# Patient Record
Sex: FEMALE | URBAN - METROPOLITAN AREA
[De-identification: names, ages, dates, MRNs, and addresses within clinical notes are randomized per-mention and may not be internally consistent; named-entity substitution may affect disease eponyms.]

---

## 2021-05-20 ENCOUNTER — TELEPHONE (OUTPATIENT)
Dept: OBGYN CLINIC | Facility: CLINIC | Age: 31
End: 2021-05-20

## 2021-05-20 NOTE — TELEPHONE ENCOUNTER
Angella left a message requesting lab results done last month  Spoke with Regina Desai whom was able to locate pts  Under her maiden name of Bev Rogers Lie is to have results faxed to office  Results received & given to KRISHAN Evangelista to review

## 2021-05-20 NOTE — TELEPHONE ENCOUNTER
Please call pt  Labs look normal   Normal Thyroid  Not anemic Hgb is 13 9  FSH/LH indicated ovulation  Awaiting Ultrasound DID she get that done?

## 2023-08-21 ENCOUNTER — NURSE TRIAGE (OUTPATIENT)
Age: 33
End: 2023-08-21

## 2023-08-21 NOTE — TELEPHONE ENCOUNTER
Regarding: Verena Raya needed for severe abdominal  ----- Message from Enrique Mins sent at 8/21/2023 11:55 AM EDT -----  Patient is in a lot of abdominal pain with chest pain and needs an urgent OV. She is going into an urgent care now.

## 2023-08-22 ENCOUNTER — OFFICE VISIT (OUTPATIENT)
Dept: GASTROENTEROLOGY | Facility: CLINIC | Age: 33
End: 2023-08-22
Payer: COMMERCIAL

## 2023-08-22 ENCOUNTER — TELEPHONE (OUTPATIENT)
Dept: GASTROENTEROLOGY | Facility: CLINIC | Age: 33
End: 2023-08-22

## 2023-08-22 VITALS
WEIGHT: 142 LBS | HEIGHT: 66 IN | BODY MASS INDEX: 22.82 KG/M2 | SYSTOLIC BLOOD PRESSURE: 104 MMHG | DIASTOLIC BLOOD PRESSURE: 60 MMHG

## 2023-08-22 DIAGNOSIS — R10.84 GENERALIZED ABDOMINAL PAIN: ICD-10-CM

## 2023-08-22 DIAGNOSIS — R10.13 EPIGASTRIC PAIN: Primary | ICD-10-CM

## 2023-08-22 PROBLEM — K21.9 GASTROESOPHAGEAL REFLUX DISEASE WITHOUT ESOPHAGITIS: Status: ACTIVE | Noted: 2023-08-22

## 2023-08-22 PROCEDURE — 99244 OFF/OP CNSLTJ NEW/EST MOD 40: CPT | Performed by: NURSE PRACTITIONER

## 2023-08-22 RX ORDER — MELOXICAM 7.5 MG/1
TABLET ORAL
COMMUNITY
Start: 2023-08-21

## 2023-08-22 RX ORDER — PANTOPRAZOLE SODIUM 40 MG/1
40 TABLET, DELAYED RELEASE ORAL DAILY
COMMUNITY
Start: 2023-08-06 | End: 2023-08-22

## 2023-08-22 RX ORDER — OMEPRAZOLE 40 MG/1
40 CAPSULE, DELAYED RELEASE ORAL DAILY
Qty: 30 CAPSULE | Refills: 5 | Status: SHIPPED | OUTPATIENT
Start: 2023-08-22

## 2023-08-22 RX ORDER — SUCRALFATE ORAL 1 G/10ML
SUSPENSION ORAL
COMMUNITY
Start: 2023-08-21

## 2023-08-22 RX ORDER — AZITHROMYCIN 500 MG/1
TABLET, FILM COATED ORAL
COMMUNITY
Start: 2023-08-06

## 2023-08-22 NOTE — LETTER
2023     Julian Escamilla DO  [address] [address]    Patient: Sergio Davis   YOB: 1990   Date of Visit: 2023       Dear Dr. Mckenzie : Thank you for referring Sergio Davis to me for evaluation. Below are my notes for this consultation. If you have questions, please do not hesitate to call me. I look forward to following your patient along with you. Sincerely,        KRISHAN Newton        CC: No Recipients    Jeff Coon  2023  4:56 PM  Sign when Signing Visit    [facility] - Outpatient Consultation  Sergio Davis 35 y.o. female MRN: [id number]  Encounter: [id number]    ASSESSMENT AND PLAN:      1. Epigastric pain  2. Chest pain  3. Generalized abdominal pain  4 to 6-week history of epigastric and mid sternal chest pain. Initially experienced reflux symptoms which were relieved with  PPI but no improvement in pain on medication  Also experiencing vague left lower quadrant abdominal pain  Evaluation at [facility] ED was negative per patient  Symptoms may be related to gastritis, esophagitis, possible PUD, H. Pylori  -Scheduled for EGD at [facility]  -Change PPI to omeprazole 40 mg daily taken 30 minutes prior to a meal  -Avoid use of NSAIDs and meloxicam which was prescribed for her in ED  -Review GERD diet and lifestyle modifications  -We will obtain records from [facility] to review recent labs and CT scan of the abdomen    4. Diffuse body aches  Patient reports diffuse muscle aches and tingling of extremities  Feels may be related to PPI  Recommend follow-up with PCP, consider possible Lyme's disease      Followup Appointment: 2 months  ______________________________________________________________________    Chief Complaint   Patient presents with   • Abdominal Pain     Pt states she feels like she has tingling pain throughout her body.  She has overall pain throughout her body. Abdominal pain and nausea, felt like something in her throat. Pt was also experiencing diarrhea. HPI:   Valencia Brewer is a 35y.o. year old female who presents 4 to 6-week history of epigastric pain and chest pain. Initially she began experiencing what she describes as heartburn. She was evaluated by her PCP and started on pantoprazole 40 mg daily which did improve reflux symptoms however she continued to experience epigastric pain and mid chest pressure. Over the past several weeks, she is experiencing generalized muscle aches and shooting pains. Also experiencing extremity tingling  Today is reporting 1 week history of lower abdominal discomfort which occurs randomly, lasts 1 to 2 minutes and resolves    Denies dysphagia, no liquid reflux. Her appetite has been decreased but denies nausea or vomiting. No unintentional weight loss  No acute change in her bowel habits. No melena or rectal bleeding    Denies regular alcohol use, no tobacco use. Takes ibuprofen 600 mg 2-3 times per day x3 days monthly for menstrual cramps. Last taken approximately 2 weeks ago    She was seen in [facility] ED in early August.  Those records are not available but she reports normal blood work and normal CT scan of the abdomen  Was also evaluated at urgent care yesterday 8/21 and prescribed sucralfate    Greater than 40 minutes spent with patient face-to-face reviewing HPI and previous evaluation    Historical Information   History reviewed. No pertinent past medical history. Past Surgical History:   Procedure Laterality Date   • TONSILLECTOMY       Social History     Substance and Sexual Activity   Alcohol Use Yes    Comment: socially     Social History     Substance and Sexual Activity   Drug Use Never     Social History     Tobacco Use   Smoking Status Never   Smokeless Tobacco Never     History reviewed. No pertinent family history.     Meds/Allergies     Current Outpatient Medications:   •  azithromycin (ZITHROMAX) 500 MG tablet  •  meloxicam (MOBIC) 7.5 mg tablet  •  pantoprazole (PROTONIX) 40 mg tablet  •  sucralfate (CARAFATE) 1 g/10 mL suspension    Allergies   Allergen Reactions   • Penicillins Hives       PHYSICAL EXAM:    Blood pressure 104/60, height 5' 5.5" (1.664 m), weight 64.4 kg (142 lb). Body mass index is 23.27 kg/m². General Appearance: NAD, cooperative, alert  Eyes: Anicteric  ENT:  Normocephalic, atraumatic, normal mucosa. Neck:  Supple, symmetrical, trachea midline,   Resp:  Clear to auscultation bilaterally; no rales, rhonchi or wheezing; respirations unlabored   CV:  S1 S2, Regular rate and rhythm; no murmur, rub, or gallop. GI:  Soft, non-tender, non-distended; normal bowel sounds; no masses, no organomegaly   Rectal: Deferred  Musculoskeletal: No cyanosis, clubbing or edema. Normal ROM. Skin:  No jaundice, rashes, or lesions   Psych: Normal affect, good eye contact  Neuro: No gross deficits, AAOx3          REVIEW OF SYSTEMS:    CONSTITUTIONAL: Denies any fever, chills, rigors, and weight loss. HEENT: No earache or tinnitus. Denies hearing loss or visual disturbances. CARDIOVASCULAR: No chest pain or palpitations. RESPIRATORY: Denies any cough, hemoptysis, shortness of breath or dyspnea on exertion. GASTROINTESTINAL: As noted in the History of Present Illness. GENITOURINARY: No problems with urination. Denies any hematuria or dysuria. NEUROLOGIC: No dizziness or vertigo, denies headaches. MUSCULOSKELETAL: Denies any muscle or joint pain. SKIN: Denies skin rashes or itching. ENDOCRINE: Denies excessive thirst. Denies intolerance to heat or cold. PSYCHOSOCIAL: Denies depression or anxiety. Denies any recent memory loss.

## 2023-08-22 NOTE — TELEPHONE ENCOUNTER
Scheduled date of EGD(as of today): 9/25/23  Physician performing EGD: Dr Mookie Mata  Location of EGD: [facility]  Instructions reviewed with patient by: Cha Bradley  Clearances: No

## 2023-08-22 NOTE — PROGRESS NOTES
[facility] - Outpatient Consultation  Court Watkins 35 y.o. female MRN: [id number]  Encounter: [id number]    ASSESSMENT AND PLAN:      1. Epigastric pain  2. Chest pain  3. Generalized abdominal pain  4 to 6-week history of epigastric and mid sternal chest pain. Initially experienced reflux symptoms which were relieved with  PPI but no improvement in pain on medication  Also experiencing vague left lower quadrant abdominal pain  Evaluation at [facility] ED was negative per patient  Symptoms may be related to gastritis, esophagitis, possible PUD, H. Pylori  -Scheduled for EGD at [facility]  -Change PPI to omeprazole 40 mg daily taken 30 minutes prior to a meal  -Avoid use of NSAIDs and meloxicam which was prescribed for her in ED  -Review GERD diet and lifestyle modifications  -We will obtain records from [facility] to review recent labs and CT scan of the abdomen    4. Diffuse body aches  Patient reports diffuse muscle aches and tingling of extremities  Feels may be related to PPI  Recommend follow-up with PCP, consider possible Lyme's disease      Followup Appointment: 2 months  ______________________________________________________________________    Chief Complaint   Patient presents with   • Abdominal Pain     Pt states she feels like she has tingling pain throughout her body. She has overall pain throughout her body. Abdominal pain and nausea, felt like something in her throat. Pt was also experiencing diarrhea. HPI:   Court Watkins is a 35y.o. year old female who presents 4 to 6-week history of epigastric pain and chest pain. Initially she began experiencing what she describes as heartburn. She was evaluated by her PCP and started on pantoprazole 40 mg daily which did improve reflux symptoms however she continued to experience epigastric pain and mid chest pressure.       Over the past several weeks, she is experiencing generalized muscle aches and shooting pains. Also experiencing extremity tingling  Today is reporting 1 week history of lower abdominal discomfort which occurs randomly, lasts 1 to 2 minutes and resolves    Denies dysphagia, no liquid reflux. Her appetite has been decreased but denies nausea or vomiting. No unintentional weight loss  No acute change in her bowel habits. No melena or rectal bleeding    Denies regular alcohol use, no tobacco use. Takes ibuprofen 600 mg 2-3 times per day x3 days monthly for menstrual cramps. Last taken approximately 2 weeks ago    She was seen in [facility] ED in early August.  Those records are not available but she reports normal blood work and normal CT scan of the abdomen  Was also evaluated at urgent care yesterday 8/21 and prescribed sucralfate    Greater than 40 minutes spent with patient face-to-face reviewing HPI and previous evaluation    Historical Information   History reviewed. No pertinent past medical history. Past Surgical History:   Procedure Laterality Date   • TONSILLECTOMY       Social History     Substance and Sexual Activity   Alcohol Use Yes    Comment: socially     Social History     Substance and Sexual Activity   Drug Use Never     Social History     Tobacco Use   Smoking Status Never   Smokeless Tobacco Never     History reviewed. No pertinent family history. Meds/Allergies     Current Outpatient Medications:   •  azithromycin (ZITHROMAX) 500 MG tablet  •  meloxicam (MOBIC) 7.5 mg tablet  •  pantoprazole (PROTONIX) 40 mg tablet  •  sucralfate (CARAFATE) 1 g/10 mL suspension    Allergies   Allergen Reactions   • Penicillins Hives       PHYSICAL EXAM:    Blood pressure 104/60, height 5' 5.5" (1.664 m), weight 64.4 kg (142 lb). Body mass index is 23.27 kg/m². General Appearance: NAD, cooperative, alert  Eyes: Anicteric  ENT:  Normocephalic, atraumatic, normal mucosa.     Neck:  Supple, symmetrical, trachea midline,   Resp:  Clear to auscultation bilaterally; no rales, rhonchi or wheezing; respirations unlabored   CV:  S1 S2, Regular rate and rhythm; no murmur, rub, or gallop. GI:  Soft, non-tender, non-distended; normal bowel sounds; no masses, no organomegaly   Rectal: Deferred  Musculoskeletal: No cyanosis, clubbing or edema. Normal ROM. Skin:  No jaundice, rashes, or lesions   Psych: Normal affect, good eye contact  Neuro: No gross deficits, AAOx3          REVIEW OF SYSTEMS:    CONSTITUTIONAL: Denies any fever, chills, rigors, and weight loss. HEENT: No earache or tinnitus. Denies hearing loss or visual disturbances. CARDIOVASCULAR: No chest pain or palpitations. RESPIRATORY: Denies any cough, hemoptysis, shortness of breath or dyspnea on exertion. GASTROINTESTINAL: As noted in the History of Present Illness. GENITOURINARY: No problems with urination. Denies any hematuria or dysuria. NEUROLOGIC: No dizziness or vertigo, denies headaches. MUSCULOSKELETAL: Denies any muscle or joint pain. SKIN: Denies skin rashes or itching. ENDOCRINE: Denies excessive thirst. Denies intolerance to heat or cold. PSYCHOSOCIAL: Denies depression or anxiety. Denies any recent memory loss.

## 2023-09-18 ENCOUNTER — TELEPHONE (OUTPATIENT)
Dept: GASTROENTEROLOGY | Facility: CLINIC | Age: 33
End: 2023-09-18

## 2023-09-22 ENCOUNTER — TELEPHONE (OUTPATIENT)
Dept: GASTROENTEROLOGY | Facility: AMBULATORY SURGERY CENTER | Age: 33
End: 2023-09-22

## 2023-09-22 NOTE — TELEPHONE ENCOUNTER
Pt returning call to Estephania Lance, stated she would available for earlier time as offered in . Please call pt back at [phone].

## 2023-09-25 ENCOUNTER — ANESTHESIA EVENT (OUTPATIENT)
Dept: GASTROENTEROLOGY | Facility: AMBULATORY SURGERY CENTER | Age: 33
End: 2023-09-25

## 2023-09-25 ENCOUNTER — HOSPITAL ENCOUNTER (OUTPATIENT)
Dept: GASTROENTEROLOGY | Facility: AMBULATORY SURGERY CENTER | Age: 33
Discharge: HOME | End: 2023-09-25
Payer: COMMERCIAL

## 2023-09-25 ENCOUNTER — ANESTHESIA (OUTPATIENT)
Dept: GASTROENTEROLOGY | Facility: AMBULATORY SURGERY CENTER | Age: 33
End: 2023-09-25

## 2023-09-25 VITALS
OXYGEN SATURATION: 100 % | WEIGHT: 140 LBS | HEART RATE: 70 BPM | TEMPERATURE: 98.7 F | SYSTOLIC BLOOD PRESSURE: 106 MMHG | RESPIRATION RATE: 15 BRPM | HEIGHT: 65 IN | BODY MASS INDEX: 23.32 KG/M2 | DIASTOLIC BLOOD PRESSURE: 55 MMHG

## 2023-09-25 DIAGNOSIS — R10.13 EPIGASTRIC PAIN: ICD-10-CM

## 2023-09-25 LAB
EXT PREGNANCY TEST URINE: NEGATIVE
EXT. CONTROL: NORMAL

## 2023-09-25 PROCEDURE — 88305 TISSUE EXAM BY PATHOLOGIST: CPT | Performed by: PATHOLOGY

## 2023-09-25 PROCEDURE — 43239 EGD BIOPSY SINGLE/MULTIPLE: CPT | Performed by: STUDENT IN AN ORGANIZED HEALTH CARE EDUCATION/TRAINING PROGRAM

## 2023-09-25 RX ORDER — PROPOFOL 10 MG/ML
INJECTION, EMULSION INTRAVENOUS AS NEEDED
Status: DISCONTINUED | OUTPATIENT
Start: 2023-09-25 | End: 2023-09-25

## 2023-09-25 RX ORDER — SODIUM CHLORIDE, SODIUM LACTATE, POTASSIUM CHLORIDE, CALCIUM CHLORIDE 600; 310; 30; 20 MG/100ML; MG/100ML; MG/100ML; MG/100ML
50 INJECTION, SOLUTION INTRAVENOUS CONTINUOUS
Status: DISCONTINUED | OUTPATIENT
Start: 2023-09-25 | End: 2023-09-25

## 2023-09-25 RX ORDER — LORATADINE 10 MG/1
TABLET ORAL
COMMUNITY

## 2023-09-25 RX ADMIN — PROPOFOL 100 MG: 10 INJECTION, EMULSION INTRAVENOUS at 13:07

## 2023-09-25 RX ADMIN — SODIUM CHLORIDE, SODIUM LACTATE, POTASSIUM CHLORIDE, CALCIUM CHLORIDE: 600; 310; 30; 20 INJECTION, SOLUTION INTRAVENOUS at 13:12

## 2023-09-25 RX ADMIN — SODIUM CHLORIDE, SODIUM LACTATE, POTASSIUM CHLORIDE, CALCIUM CHLORIDE 50 ML/HR: 600; 310; 30; 20 INJECTION, SOLUTION INTRAVENOUS at 13:02

## 2023-09-25 RX ADMIN — PROPOFOL 50 MG: 10 INJECTION, EMULSION INTRAVENOUS at 13:09

## 2023-09-25 NOTE — H&P
History and Physical - [facility]  Sunshine Hoover 35 y.o. female MRN: [id number]    HPI: Sunshine Hoover is a 35 y.o. female who presents for EGD for evaluation of generalized abdominal pain. REVIEW OF SYSTEMS: Per the HPI, and otherwise unremarkable. Historical Information   Past Medical History:   Diagnosis Date   • Anxiety    • Asthma    • Cancer ([address])     skin   • Depression    • Pneumonia      Past Surgical History:   Procedure Laterality Date   • SKIN BIOPSY     • SKIN CANCER EXCISION Right     upper thigh   • TONSILLECTOMY       Social History   Social History     Substance and Sexual Activity   Alcohol Use Yes    Comment: socially     Social History     Substance and Sexual Activity   Drug Use Never     Social History     Tobacco Use   Smoking Status Never   Smokeless Tobacco Never     History reviewed. No pertinent family history. Meds/Allergies       Current Outpatient Medications:   •  loratadine (CLARITIN) 10 mg tablet  •  azithromycin (ZITHROMAX) 500 MG tablet  •  meloxicam (MOBIC) 7.5 mg tablet  •  omeprazole (PriLOSEC) 40 MG capsule  •  sucralfate (CARAFATE) 1 g/10 mL suspension    Current Facility-Administered Medications:   •  lactated ringers infusion, 50 mL/hr, Intravenous, Continuous    Allergies   Allergen Reactions   • Penicillins Hives       Objective     Temp 98.7 °F (37.1 °C) (Temporal)   Ht 5' 5" (1.651 m)   Wt 63.5 kg (140 lb)   LMP 08/29/2023   BMI 23.30 kg/m²     PHYSICAL EXAM    Gen: NAD AAOx3  Head: Normocephalic, Atraumatic  CV: S1S2 RRR no m/r/g  CHEST: Clear b/l no c/r/w  ABD: soft, +BS NT/ND  EXT: no edema    ASSESSMENT/PLAN:  This is a 35y.o. year old female here for EGD, and she is stable and optimized for her procedure.

## 2023-09-25 NOTE — ANESTHESIA POSTPROCEDURE EVALUATION
Post-Op Assessment Note    CV Status:  Stable  Pain Score: 0    Pain management: adequate     Mental Status:  Alert and awake   Hydration Status:  Euvolemic   PONV Controlled:  Controlled   Airway Patency:  Patent      Post Op Vitals Reviewed: Yes      Staff: CRNA         No notable events documented.     BP   101/67   Temp   98   Pulse  80   Resp  16   SpO2   95

## 2023-09-25 NOTE — ANESTHESIA PREPROCEDURE EVALUATION
Procedure:  EGD    Relevant Problems   ANESTHESIA (within normal limits)      GI/HEPATIC   (+) Gastroesophageal reflux disease without esophagitis      NEURO/PSYCH   (+) Anxiety      PULMONARY   (+) Asthma (Resolved) (childhood)   (-) Sleep apnea   (-) Smoking   (-) URI (upper respiratory infection)      Physical Exam    Airway    Mallampati score: II  TM Distance: >3 FB  Neck ROM: full     Dental   No notable dental hx     Cardiovascular      Pulmonary      Other Findings        Anesthesia Plan  ASA Score- 1     Anesthesia Type- IV sedation with anesthesia with ASA Monitors. Additional Monitors:   Airway Plan:           Plan Factors-Exercise tolerance (METS): >4 METS. Chart reviewed. Existing labs reviewed. Patient summary reviewed. Patient is not a current smoker. Induction- intravenous. Postoperative Plan-     Informed Consent- Anesthetic plan and risks discussed with patient. I personally reviewed this patient with the CRNA. Discussed and agreed on the Anesthesia Plan with the CRNA. Bertram Herrera

## 2023-09-26 ENCOUNTER — NURSE TRIAGE (OUTPATIENT)
Age: 33
End: 2023-09-26

## 2023-09-26 NOTE — TELEPHONE ENCOUNTER
----- Message from Susana Hany sent at 9/26/2023  9:53 AM EDT -----  Pt called in requesting to speak to a nurse. Pt stated that she had her EGD completed yesterday. She went home and slept. When she woke up in the evening she was feeling tingling and pain all over her body. She is also having chest pain.

## 2023-09-27 NOTE — TELEPHONE ENCOUNTER
Pt call transferred to nurses line     Pt explained that her gas pain was relieved by gas-x and ginger ale yesterday. However, today symptoms are different w/ left shoulder and left arm and left neck pain with basic movement. She stated they feel stiff. I advised heating pad, tylenol, hot shower. However, pt stated she tried these methods and is concerned by her symptoms. She rates her pain 9/10. I advised ED.

## 2023-09-28 PROCEDURE — 88305 TISSUE EXAM BY PATHOLOGIST: CPT | Performed by: PATHOLOGY

## 2023-10-05 ENCOUNTER — OFFICE VISIT (OUTPATIENT)
Dept: GASTROENTEROLOGY | Facility: CLINIC | Age: 33
End: 2023-10-05
Payer: COMMERCIAL

## 2023-10-05 VITALS
BODY MASS INDEX: 22.99 KG/M2 | DIASTOLIC BLOOD PRESSURE: 74 MMHG | WEIGHT: 138 LBS | HEIGHT: 65 IN | SYSTOLIC BLOOD PRESSURE: 112 MMHG

## 2023-10-05 DIAGNOSIS — R10.84 GENERALIZED ABDOMINAL PAIN: Primary | ICD-10-CM

## 2023-10-05 DIAGNOSIS — R52 BODY ACHES: ICD-10-CM

## 2023-10-05 DIAGNOSIS — M54.2 NECK PAIN: ICD-10-CM

## 2023-10-05 PROCEDURE — 99214 OFFICE O/P EST MOD 30 MIN: CPT | Performed by: STUDENT IN AN ORGANIZED HEALTH CARE EDUCATION/TRAINING PROGRAM

## 2023-10-05 RX ORDER — MELATONIN
1000 DAILY
COMMUNITY

## 2023-10-05 RX ORDER — CHLORAL HYDRATE 500 MG
1000 CAPSULE ORAL DAILY
COMMUNITY

## 2023-10-05 RX ORDER — FAMOTIDINE 20 MG/1
20 TABLET, FILM COATED ORAL 2 TIMES DAILY
Qty: 28 TABLET | Refills: 0 | Status: SHIPPED | OUTPATIENT
Start: 2023-10-05 | End: 2023-10-19

## 2023-10-05 RX ORDER — IBUPROFEN 600 MG/1
TABLET ORAL
COMMUNITY
Start: 2023-09-27

## 2023-10-05 RX ORDER — SACCHAROMYCES BOULARDII 250 MG
250 CAPSULE ORAL 2 TIMES DAILY
COMMUNITY

## 2023-10-05 NOTE — PROGRESS NOTES
[facility] - Outpatient Follow-up Note  Sin Ray 35 y.o. female MRN: [id number]  Encounter: [id number]    ASSESSMENT AND PLAN:      1. Generalized abdominal pain  Possible functional etiology. Uncertain if related to her body aches in the past.  Lyme testing is negative. She is doing well on liquid supplement and will continue this. We discussed trying a low-dose tricyclic antidepressant but will hold off for now. She has about throughout allergy testing and I informed her this is not advised per our guidelines but she can follow-up with allergist if necessary. We also discussed the low FODMAP diet. - famotidine (PEPCID) 20 mg tablet; Take 1 tablet (20 mg total) by mouth 2 (two) times a day for 14 days  Dispense: 28 tablet; Refill: 0    2. Neck pain  Possible neck strain/spasm, may be related to positioning during EGD although it was an uncomplicated procedure and not prolonged. She has had other body aches in the past so uncertain if there is an underlying musculoskeletal condition. We will provide referral to rheumatology to see if she needs any further work-up. - Ambulatory Referral to Rheumatology; Future    3. Body aches  She is taking ibuprofen for body aches. We discussed the risk for peptic ulcer disease on high-dose ibuprofen. Given that she is not tolerating PPI in the past we will start famotidine to try to provide some GI protection in the meantime. She does not like being on medication so we will prescribe a 2-week course.       Follow up appointment: 3 months  ______________________________________________________________________    Chief Complaint   Patient presents with   • Follow-up     Procedure follow up,  patient seen in [address] [place] [address] last week post op for pain, patient is still having epigastric pain     HPI:   Patient is a 35 y.o. female with a significant PMH of reflux, body aches presenting for follow up regarding neck/arm pain after recent endoscopy. She was initially seen in the office for reflux symptoms and was noted to have body aches at that time. She had been started on omeprazole but felt like it made her stomach symptoms worse so stopped it. Follow-up endoscopy was unremarkable with no significant findings on biopsies. Weeks after her endoscopy she experienced neck pain going down her left arm so went to [facility] emergency department where they did blood work-up including Lyme testing which was negative. Chest x-ray was unremarkable. She was started on ibuprofen 600 mg at that time. Her left arm pain improved but then she started having neck pain with right arm pain. Pain described as burning/tingling. She has had similar pains in other joints and extremities in the past.  She has recently started a liquid supplement with improvement of her abdominal complaints. Historical Information   Past Medical History:   Diagnosis Date   • Anxiety    • Asthma    • Cancer ([address])     skin   • Depression    • Pneumonia      Past Surgical History:   Procedure Laterality Date   • SKIN BIOPSY     • SKIN CANCER EXCISION Right     upper thigh   • TONSILLECTOMY       Social History     Substance and Sexual Activity   Alcohol Use Yes    Comment: socially     Social History     Substance and Sexual Activity   Drug Use Never     Social History     Tobacco Use   Smoking Status Never   Smokeless Tobacco Never     History reviewed. No pertinent family history.       Current Outpatient Medications:   •  Calcium Carbonate (CALCIUM 500 PO)  •  cholecalciferol (VITAMIN D3) 1,000 units tablet  •  cyanocobalamin (VITAMIN B-12) 100 MCG tablet  •  famotidine (PEPCID) 20 mg tablet  •  ibuprofen (MOTRIN) 362 mg tablet  •  Licorice, Glycyrrhiza glabra, (LICORICE ROOT PO)  •  loratadine (CLARITIN) 10 mg tablet  •  Omega-3 Fatty Acids (fish oil) 1,000 mg  •  Prenatal MV-Min-Fe Fum-FA-DHA (PRENATAL 1 PO)  •  saccharomyces boulardii (FLORASTOR) 250 mg capsule  • azithromycin (ZITHROMAX) 500 MG tablet  •  meloxicam (MOBIC) 7.5 mg tablet  •  omeprazole (PriLOSEC) 40 MG capsule  •  sucralfate (CARAFATE) 1 g/10 mL suspension  Allergies   Allergen Reactions   • Penicillins Hives     Reviewed medications and allergies and updated as indicated    PHYSICAL EXAM:    Blood pressure 112/74, height 5' 5" (1.651 m), weight 62.6 kg (138 lb), last menstrual period 08/29/2023. Body mass index is 22.96 kg/m². General Appearance: NAD, cooperative, alert  Eyes: Anicteric  GI:  Soft, non-tender, non-distended; normal bowel sounds; no masses, no organomegaly   Rectal: Deferred  Musculoskeletal: No edema. Skin:  No jaundice    Lab Results:   No results found for: "WBC", "HGB", "MCV", "PLT"  No results found for: "NA", "K", "CL", "CO2", "ANIONGAP", "BUN", "CREATININE", "GLUCOSE", "GLUF", "CALCIUM", "Marthenia Daniella", "AST", "ALT", "ALKPHOS", "PROT", "BILITOT", "EGFR"  No results found for: "IRON", "TIBC", "FERRITIN"  No results found for: "LIPASE"    Radiology Results:   EGD    Result Date: 9/25/2023  Narrative: Table formatting from the original result was not included. [address] [address] [facility] [phone] DATE OF SERVICE: 9/25/23 PHYSICIAN(S): Attending: Enrike Berg DO Fellow: No Staff Documented INDICATION: Epigastric pain POST-OP DIAGNOSIS: See the impression below. PREPROCEDURE: Informed consent was obtained for the procedure, including sedation. Risks of perforation, hemorrhage, adverse drug reaction and aspiration were discussed. The patient was placed in the left lateral decubitus position. Patient was explained about the risks and benefits of the procedure. Risks including but not limited to bleeding, infection, and perforation were explained in detail. Also explained about less than 100% sensitivity with the exam and other alternatives.  PROCEDURE: EGD DETAILS OF PROCEDURE: Patient was taken to the procedure room where a time out was performed to confirm correct patient and correct procedure. The patient underwent monitored anesthesia care, which was administered by an anesthesia professional. The patient's blood pressure, ECG, heart rate, level of consciousness, respirations and oxygen were monitored throughout the procedure. The scope was introduced through the mouth and advanced to the second part of the duodenum. Retroflexion was performed in the fundus. The patient's estimated blood loss was minimal (<5 mL). The procedure was not difficult. The patient tolerated the procedure well. There were no apparent adverse events.  ANESTHESIA INFORMATION: ASA: I Anesthesia Type: IV Sedation with Anesthesia MEDICATIONS: lactated ringers infusion 150 mL*  *From user-documented volume (Totals for administrations occurring from 1305 to 1314 on 09/25/23) FINDINGS: The esophagus, stomach and duodenum appeared normal. Performed forceps biopsies in the stomach to rule out H. pylori Performed multiple forceps biopsies in the middle third of the esophagus to rule out eosinophilic esophagitis Regular Z-line 39 cm from the incisors SPECIMENS: ID Type Source Tests Collected by Time Destination 1 : antral bx r/o h pylori Tissue Stomach TISSUE EXAM Chiquita Hughes DO 9/25/2023  1:14 PM  2 : esophageal bx r/o eoe Tissue Esophagus TISSUE EXAM Chiquita Hughes DO 9/25/2023  1:15 PM      Impression: The esophagus, stomach and duodenum appeared normal. Performed forceps biopsies in the stomach to rule out H. pylori Performed forceps biopsies in the middle third of the esophagus to rule out eosinophilic esophagitis RECOMMENDATION:  Await pathology results  We will contact you with your stomach and esophageal biopsies in 1-2 weeks Please follow up in the GI office as previously scheduled    Chiquita Hughes DO

## 2023-10-05 NOTE — PATIENT INSTRUCTIONS
You can research a low FODMAP diet to evaluate for any food sensitivities. If you want to, you can look up peppermint oil for your symptoms such as IBgard. We will give you a referral for rheumatology. Please take the famotidine to protect your stomach while you are taking ibuprofen, we provided a 2-week course.

## 2023-10-10 ENCOUNTER — TELEPHONE (OUTPATIENT)
Dept: GASTROENTEROLOGY | Facility: CLINIC | Age: 33
End: 2023-10-10

## 2023-10-10 NOTE — TELEPHONE ENCOUNTER
Patients GI provider:  Dr. Conrad Mitchell    Number to return call: [phone]    Reason for call: Pt calling requesting that all records pertaining to EGD [facility]. She is asking for these records to be sent urgently as she has a apt with them tomorrow.  FAX: [phone]    Scheduled procedure/appointment date if applicable: apt  6/23

## 2024-02-23 ENCOUNTER — INITIAL PRENATAL (OUTPATIENT)
Dept: OBGYN CLINIC | Facility: CLINIC | Age: 34
End: 2024-02-23
Payer: COMMERCIAL

## 2024-02-23 VITALS
HEIGHT: 65 IN | SYSTOLIC BLOOD PRESSURE: 104 MMHG | BODY MASS INDEX: 23.32 KG/M2 | DIASTOLIC BLOOD PRESSURE: 62 MMHG | WEIGHT: 140 LBS

## 2024-02-23 DIAGNOSIS — O36.80X0 PREGNANCY OF UNKNOWN ANATOMIC LOCATION: Primary | ICD-10-CM

## 2024-02-23 DIAGNOSIS — N91.2 AMENORRHEA: ICD-10-CM

## 2024-02-23 DIAGNOSIS — J45.20 MILD INTERMITTENT ASTHMA WITHOUT COMPLICATION: ICD-10-CM

## 2024-02-23 PROBLEM — C43.71 MALIGNANT MELANOMA OF RIGHT LOWER EXTREMITY INCLUDING HIP (HCC): Status: ACTIVE | Noted: 2024-02-23

## 2024-02-23 PROBLEM — O99.519 ASTHMA DURING PREGNANCY: Status: ACTIVE | Noted: 2024-02-23

## 2024-02-23 PROBLEM — J45.909 ASTHMA DURING PREGNANCY: Status: ACTIVE | Noted: 2024-02-23

## 2024-02-23 PROCEDURE — 76817 TRANSVAGINAL US OBSTETRIC: CPT | Performed by: STUDENT IN AN ORGANIZED HEALTH CARE EDUCATION/TRAINING PROGRAM

## 2024-02-23 PROCEDURE — 99214 OFFICE O/P EST MOD 30 MIN: CPT | Performed by: STUDENT IN AN ORGANIZED HEALTH CARE EDUCATION/TRAINING PROGRAM

## 2024-02-23 RX ORDER — ALBUTEROL SULFATE 90 UG/1
2 AEROSOL, METERED RESPIRATORY (INHALATION) EVERY 6 HOURS PRN
Qty: 8.5 G | Refills: 1 | Status: SHIPPED | OUTPATIENT
Start: 2024-02-23

## 2024-02-23 RX ORDER — LORATADINE 10 MG/1
10 TABLET ORAL DAILY
COMMUNITY

## 2024-02-23 NOTE — ASSESSMENT & PLAN NOTE
- Reviewed very small sac-like structure within the uterus, which may be consistent with very early normal pregnancy. However, given no yolk sac or fetal pole, cannot definitively rule out ectopic today.  - Will obtain serum quantitative hCG and repeat in 48 hours.   - Given unknown blood type, will check T&S.   - Ectopic precautions given. Patient instructed to call if she has onset of abdominal pain or bleeding. Reviewed importance of completion of labs to rule out ectopic.   - Return to office for repeat ultrasound in 11+ days.

## 2024-02-23 NOTE — PROGRESS NOTES
[facility] - [place] [address], [address], [place] [address]    Assessment/Plan:  1. Pregnancy of unknown anatomic location  Assessment & Plan:  - Reviewed very small sac-like structure within the uterus, which may be consistent with very early normal pregnancy. However, given no yolk sac or fetal pole, cannot definitively rule out ectopic today.  - Will obtain serum quantitative hCG and repeat in 48 hours.   - Given unknown blood type, will check T&S.   - Ectopic precautions given. Patient instructed to call if she has onset of abdominal pain or bleeding. Reviewed importance of completion of labs to rule out ectopic.   - Return to office for repeat ultrasound in 11+ days.     Orders:  -     hCG, quantitative; Future  -     Type and screen; Future  -     hCG, quantitative  -     Type and screen  -     hCG, quantitative; Future  -     hCG, quantitative  -     AMB US OB < 14 weeks single or first gestation level 1    2. Amenorrhea    3. Mild intermittent asthma without complication  -     albuterol (ProAir HFA) 90 mcg/act inhaler; Inhale 2 puffs every 6 (six) hours as needed for wheezing        Subjective:   Angella Gerber is a 33 y.o.  here for evaluation of amenorrhea and positive pregnancy test at home.     CC:   Chief Complaint   Patient presents with    Pregnancy Ultrasound     Concerned about household mold exposure       HPI: Patient presents for evaluation of amenorrhea. She reports unsure LMP, but believes it was around 2024. She reports very mild pelvic cramping but no bleeding or discharge.     Patient reports that she was recently diagnosed with symptoms consistent with mold exposure. Her home was recently treated and she is awaiting environmental testing results. In the interim, she is staying with her parents.     ROS: Negative except as noted in HPI    Patient's last menstrual period was 2023 (approximate).       She  reports being sexually active and has had  partner(s) who are male. She reports using the following method of birth control/protection: None.       The following portions of the patient's history were reviewed and updated as appropriate:   Past Medical History:   Diagnosis Date    Anxiety     Asthma     Cancer (HCC)     skin    Depression     Pneumonia      Past Surgical History:   Procedure Laterality Date    SKIN BIOPSY      SKIN CANCER EXCISION Right     upper thigh    TONSILLECTOMY       Family History   Problem Relation Age of Onset    Miscarriages / Stillbirths Mother     Osteoporosis Mother     Seizures Mother         Childhood    Cancer Maternal Grandfather     Cancer Maternal Grandmother     Diabetes Maternal Grandmother     Miscarriages / Stillbirths Maternal Grandmother     Osteoporosis Maternal Grandmother     Heart attack Paternal Grandfather     Heart disease Paternal Grandfather      Social History     Socioeconomic History    Marital status: /Civil Union     Spouse name: None    Number of children: None    Years of education: None    Highest education level: None   Occupational History    None   Tobacco Use    Smoking status: Former     Current packs/day: 0.00     Types: Cigarettes     Quit date: 2015     Years since quittin.1    Smokeless tobacco: Never   Vaping Use    Vaping status: Never Used   Substance and Sexual Activity    Alcohol use: Yes     Comment: rarely    Drug use: Never    Sexual activity: Yes     Partners: Male     Birth control/protection: None   Other Topics Concern    None   Social History Narrative    None     Social Determinants of Health     Financial Resource Strain: Not on file   Food Insecurity: Not on file   Transportation Needs: Not on file   Physical Activity: Not on file   Stress: Not on file   Social Connections: Not on file   Intimate Partner Violence: Not on file   Housing Stability: Not on file     Outpatient Medications Marked as Taking for the 24 encounter (Initial Prenatal) with Keaton  "MD Aramis   Medication    albuterol (ProAir HFA) 90 mcg/act inhaler    Calcium Carbonate (CALCIUM 500 PO)    Licorice, Glycyrrhiza glabra, (LICORICE ROOT PO)    loratadine (Claritin) 10 mg tablet    Omega-3 Fatty Acids (fish oil) 1,000 mg    Prenatal MV-Min-Fe Fum-FA-DHA (PRENATAL 1 PO)     Allergies   Allergen Reactions    Penicillins Hives         FIRST TRIMESTER OBSTETRIC ULTRASOUND  Date of study: 2/23/2024  Performed by: Keaton Self MD     INDICATION: Amenorrhea, viability    COMPARISON: None.     TECHNIQUE:   Transvaginal imaging was performed to assess the gestation, myometrial/endometrial architecture and ovarian parenchymal detail.    The study includes volumetric sweeps and traditional still imaging technique.      FINDINGS:     A tiny intrauterine sac-like structure is noted measuring 5 mm in diameter. No yolk sac or fetal pole are visible. The sac is located fundally and is surrounded by endometrium. This possibly represents a very early gestational sac.      UTERUS/ADNEXA:   Bilateral ovaries are visualized and appear normal. There is no adnexal mass or suspicion of ectopic pregnancy.   No adnexal mass or pathologic cyst.  No free fluid identified.     IMPRESSION:    Patient's last menstrual period was 12/31/2023 (approximate).  Gestational age based on LMP: 7w5d    Findings of today's ultrasound may represent a very early intrauterine pregnancy. However, this technically represents a pregnancy of unknown anatomic location. While there are no findings suggestive of ectopic pregnancy, this cannot be definitively rule out today. Correlation with serial hCG is recommended.     Keaton Self MD  2/23/2024 9:05 AM    Objective:  /62 (BP Location: Right arm, Patient Position: Sitting, Cuff Size: Standard)   Ht 5' 5\" (1.651 m)   Wt 63.5 kg (140 lb)   LMP 12/31/2023 (Approximate)   Breastfeeding No   BMI 23.30 kg/m²        Chaperone present? Yes: Fuentes Franco MA.    General Appearance: " alert and oriented, in no acute distress.   Abdomen: Soft, non-tender, non-distended, no masses, no rebound or guarding.  Pelvic:       External genitalia: Normal appearance, no abnormal pigmentation, no lesions or masses. Normal Bartholin's and [place]'s.      Urinary system: Urethral meatus normal, bladder non-tender.      Vaginal: normal mucosa without prolapse or lesions. Normal-appearing physiologic discharge.      Cervix: Normal-appearing, well-epithelialized, no gross lesions or masses. No cervical motion tenderness.      Adnexa: No adnexal masses or tenderness noted.      Uterus: Normal-sized, regular contour, midline, mobile, no uterine tenderness.   Extremities: Normal range of motion.   Skin: normal, no rash or abnormalities  Neurologic: alert, oriented x3  Psychiatric: Appropriate affect, mood stable, cooperative with exam.        Keaton Self MD  2/23/2024 9:10 AM

## 2024-02-25 LAB — HCG INTACT+B SERPL-ACNC: 8358 MIU/ML

## 2024-02-26 ENCOUNTER — TELEPHONE (OUTPATIENT)
Age: 34
End: 2024-02-26

## 2024-02-26 NOTE — TELEPHONE ENCOUNTER
Patient called, went to have her HCG completed at a different [facility] but had to leave due to the line and her work start time. Patient is going to go back to the original lab to have the HCG done. [facility] [phone].

## 2024-02-27 ENCOUNTER — TELEPHONE (OUTPATIENT)
Dept: LABOR AND DELIVERY | Facility: HOSPITAL | Age: 34
End: 2024-02-27

## 2024-02-27 NOTE — TELEPHONE ENCOUNTER
Spoke to patient and she said she went to a [facility] this morning and hade the HCG done.     She has no frances for their portal - there was nothing in media yet scanned in

## 2024-02-27 NOTE — TELEPHONE ENCOUNTER
Please contact patient regarding need for repeat hCG, which was due yesterday. Please review ectopic precautions. She should complete repeat hCG ASAP. If she experiences onset of abdominal pain or bleeding, she should be evaluated in ER.     Keaton Self MD  2/27/2024 12:23 PM

## 2024-02-28 LAB
ABO GROUP BLD: NORMAL
BLD GP AB SCN SERPL QL: NEGATIVE
HCG INTACT+B SERPL-ACNC: NORMAL MIU/ML
RH BLD: POSITIVE

## 2024-03-01 ENCOUNTER — TELEPHONE (OUTPATIENT)
Dept: OBGYN CLINIC | Facility: CLINIC | Age: 34
End: 2024-03-01

## 2024-03-01 LAB — HCG INTACT+B SERPL-ACNC: NORMAL MIU/ML

## 2024-03-01 NOTE — TELEPHONE ENCOUNTER
Called patient to review results of her repeat hCG. Patient did not answer. I left a voicemail informing her that her hCG is rising, but that the rate of rise is less than is expected. I instructed the patient to call back to discuss.     When patient calls, please assess her symptoms. If no bleeding or cramping, I recommend continuing with current plan of repeat ultrasound next week, as at least a 11 day interval between ultrasounds is necessary to definitively determine pregnancy viability. If she should experience bleeding or pelvic/abdominal pain (more than mild cramping), she should be further evaluated with repeat ultrasound at Radiology. This should be performed STAT (same-day). This should not wait, as ectopic has not been ruled out.    Keaton Self MD  3/1/2024 8:18 AM

## 2024-03-01 NOTE — TELEPHONE ENCOUNTER
Patient called back. Reviewed at length. She reports mild cramping discomfort, which is less than a normal period. No bleeding. Denies abdominal pain. Reports isolated episode of what she describes as vaginal pain last night that took her breath away, but this was brief and has fully resolved. Recommend repeat US on 3/7, as previously planned. She understands that if she experiences bleeding or sudden onset of abdominal/pelvic pain, she should proceed to the ER for evaluation.     Keaton Self MD  3/1/2024 9:40 AM

## 2024-03-07 ENCOUNTER — INITIAL PRENATAL (OUTPATIENT)
Dept: OBGYN CLINIC | Facility: CLINIC | Age: 34
End: 2024-03-07
Payer: COMMERCIAL

## 2024-03-07 VITALS
DIASTOLIC BLOOD PRESSURE: 56 MMHG | WEIGHT: 139.4 LBS | HEIGHT: 65 IN | BODY MASS INDEX: 23.22 KG/M2 | SYSTOLIC BLOOD PRESSURE: 118 MMHG

## 2024-03-07 DIAGNOSIS — N91.2 AMENORRHEA: Primary | ICD-10-CM

## 2024-03-07 DIAGNOSIS — O36.80X0 PREGNANCY OF UNKNOWN ANATOMIC LOCATION: ICD-10-CM

## 2024-03-07 DIAGNOSIS — Z36.87 UNSURE OF LMP (LAST MENSTRUAL PERIOD) AS REASON FOR ULTRASOUND SCAN: ICD-10-CM

## 2024-03-07 PROCEDURE — 76817 TRANSVAGINAL US OBSTETRIC: CPT | Performed by: OBSTETRICS & GYNECOLOGY

## 2024-03-07 PROCEDURE — 99213 OFFICE O/P EST LOW 20 MIN: CPT | Performed by: OBSTETRICS & GYNECOLOGY

## 2024-03-12 ENCOUNTER — OFFICE VISIT (OUTPATIENT)
Dept: OBSTETRICS AND GYNECOLOGY | Facility: CLINIC | Age: 34
End: 2024-03-12
Payer: COMMERCIAL

## 2024-03-12 VITALS
HEIGHT: 65 IN | SYSTOLIC BLOOD PRESSURE: 114 MMHG | WEIGHT: 140 LBS | DIASTOLIC BLOOD PRESSURE: 70 MMHG | BODY MASS INDEX: 23.32 KG/M2

## 2024-03-12 DIAGNOSIS — N91.2 AMENORRHEA: Primary | ICD-10-CM

## 2024-03-12 PROCEDURE — 99204 OFFICE O/P NEW MOD 45 MIN: CPT | Performed by: OBSTETRICS & GYNECOLOGY

## 2024-03-12 PROCEDURE — 76801 OB US < 14 WKS SINGLE FETUS: CPT | Performed by: OBSTETRICS & GYNECOLOGY

## 2024-03-12 RX ORDER — MULTIVITAMIN WITH IRON
1000 TABLET ORAL DAILY
COMMUNITY

## 2024-03-12 NOTE — PROGRESS NOTES
"Initial Prenatal Note  : 1990  MRN: [id number]                                          Visit Date: 3/12/2024     Kelli Hernandez is being seen today for her first obstetrical visit.  This is a planned pregnancy. She is at Unknown gestation. Relationship with FOB: spouse, living together. Pregnancy history fully reviewed.   Visit Vitals  /70 (BP Location: Right upper arm, Patient Position: Sitting)   Ht 1.651 m (5' 5\")   Wt 63.5 kg (140 lb)   LMP 01/15/2024 (Approximate)   BMI 23.30 kg/m²     Pregnancy Test: Home urine pregnancy test reported positive by patient      Prenatal Physical Exam    Prenatal Exam:       Pelvic Prenatal Exam:            [facility]  Review of Systems  OBGyn Exam   Diagnoses and all orders for this visit:    Amenorrhea (Primary)  -     ABO/RH  -     Antibody Screen  -     Chlamydia/GC RNA:ThinPrep/Urine/Swab  -     Cystic Fibrosis Screen  -     CBC  -     Hepatitis B surface antigen  -     Hepatitis C antibody  -     HIV 1,2 AB P24 AG  -     Rubella antibody, IgG  -     Urine culture  -     Varicella zoster antibody, IgG  -     Syphilis Antibodies  -     SPINAL MUSCULAR ATROPHY (SMA) [facility]      Pt was seen at [facility] had viability scan was given PAT based on ultrasound since unsure of exact LMP. PAT 10/27 so 7-2 weeks today which is consistent with my bedside US. Pt PMH anxiety/depression/migraines/mold toxicity. Counseled on genetics - desires NIPT and SMA/CF. Will send for NT scan - aware to call PTC. Will bring back in 3 weeks for visit and NIPT.     Past Medical History:  has a past medical history of Asthma and Skin cancer.    Past Surgical History:  has a past surgical history that includes Skin surgery.    Social History:   Social History     Tobacco Use   • Smoking status: Former     Types: Cigarettes   • Smokeless tobacco: Never   Vaping Use   • Vaping Use: Never used   Substance Use Topics   • Alcohol use: Not Currently   • Drug use: Never       Family History: " family history includes Diabetes in her maternal grandmother; Emphysema in her paternal grandmother; Gallbladder disease in her maternal grandmother; Heart disease in her paternal grandfather; Hodgkin's lymphoma in her maternal grandfather; Skin cancer in her maternal grandfather and maternal grandmother.    Medications:   Current Outpatient Medications:   •  omega-3 fatty acids-fish oil 300-1,000 mg capsule, Take 1,000 mg by mouth daily., Disp: , Rfl:   •  PRENATAL 105-IRON-FOLIC AC-DHA ORAL, Take by mouth., Disp: , Rfl:       Allergies: is allergic to penicillins and pollen extracts.   No follow-ups on file.  Jeanie Harris DO

## 2024-03-14 LAB
BACTERIA UR CULT: NO GROWTH
BACTERIA UR CULT: NORMAL
C TRACH RRNA SPEC QL NAA+PROBE: NEGATIVE
N GONORRHOEA RRNA SPEC QL NAA+PROBE: NEGATIVE

## 2024-03-18 PROBLEM — N91.2 AMENORRHEA: Status: ACTIVE | Noted: 2024-03-07

## 2024-03-18 NOTE — PROGRESS NOTES
"Routine Prenatal Visit  [facility] - [place]  [address] [place] [address]      Assessment/Plan:  33 y.o.  presenting with missed menses.  Viable pregnancy 6w3d by ultrasound. This is not consistent with her LMP  - Continue/start prenatal vitamin  - We reviewed her current medications and discussed which are safe to continue in pregnancy  - We briefly discussed options for aneuploidy screening, to be discussed further at the prenatal intake  - Schedule prenatal intake with RN and initial prenatal visit; prenatal labs will be ordered during the prenatal intake      Subjective:    CC: Missed period    Angella Gerber is a 33 y.o.  who presents with missed menses.  Patient's last menstrual period was 2023 (approximate).    Patient notes that this pregnancy was planned and desired.  She was not using contraception at the time of conception. She reports she is uncertain of her LMP and that she has regular menses. She has has no vaginal bleeding since her LMP.    Objective:  /56   Ht 5' 5\" (1.651 m)   Wt 63.2 kg (139 lb 6.4 oz)   LMP 2023 (Approximate)   BMI 23.20 kg/m²     Physical Exam:  General: Well appearing, no distress  CV: Regular rate  Respiratory: Unlabored breathing  Abdomen: Soft, nontender  Extremities: Without edema  Mood and Affect: Appropriate    Transvaginal Pelvic Ultrasound  Rose IUP  Yolk sac: Present  Fetal Pole: Present  CRL consistent with EGA of 6w 3d  Cardiac activity: Present   bpm  No adnexal masses appreciated    "

## 2024-04-09 ENCOUNTER — ROUTINE PRENATAL (OUTPATIENT)
Dept: OBSTETRICS AND GYNECOLOGY | Facility: CLINIC | Age: 34
End: 2024-04-09
Payer: COMMERCIAL

## 2024-04-09 VITALS
BODY MASS INDEX: 23.76 KG/M2 | HEIGHT: 65 IN | WEIGHT: 142.6 LBS | DIASTOLIC BLOOD PRESSURE: 70 MMHG | SYSTOLIC BLOOD PRESSURE: 104 MMHG

## 2024-04-09 DIAGNOSIS — Z36.9 ANTENATAL SCREENING ENCOUNTER: Primary | ICD-10-CM

## 2024-04-09 DIAGNOSIS — Z34.00 PRIMIGRAVIDA, ANTEPARTUM: ICD-10-CM

## 2024-04-09 PROCEDURE — 0502F SUBSEQUENT PRENATAL CARE: CPT | Performed by: OBSTETRICS & GYNECOLOGY

## 2024-04-09 NOTE — PRENATAL NOTE
nt needed and all labs from last visit including ffdna     Doing well  May want to know sex  Set up nt and joseph  Went over set up of practice use of male and female moonlighters etc

## 2024-04-12 LAB
ABO GROUP BLD: NORMAL
BLD GP AB SCN SERPL QL: NEGATIVE
ERYTHROCYTE [DISTWIDTH] IN BLOOD BY AUTOMATED COUNT: 12.6 % (ref 11.7–15.4)
HBV SURFACE AG SERPL QL IA: NEGATIVE
HCG INTACT+B SERPL-ACNC: NORMAL MIU/ML
HCT VFR BLD AUTO: 37.8 % (ref 34–46.6)
HCV IGG SERPL QL IA: NON REACTIVE
HGB BLD-MCNC: 12.7 G/DL (ref 11.1–15.9)
HIV 1+2 AB+HIV1 P24 AG SERPL QL IA: NON REACTIVE
MCH RBC QN AUTO: 30.2 PG (ref 26.6–33)
MCHC RBC AUTO-ENTMCNC: 33.6 G/DL (ref 31.5–35.7)
MCV RBC AUTO: 90 FL (ref 79–97)
PLATELET # BLD AUTO: 370 X10E3/UL (ref 150–450)
RBC # BLD AUTO: 4.2 X10E6/UL (ref 3.77–5.28)
RH BLD: POSITIVE
RUBV IGG SERPL IA-ACNC: 2.14 INDEX
TREPONEMA PALLIDUM IGG+IGM AB [PRESENCE] IN SERUM OR PLASMA BY IMMUNOASSAY: NON REACTIVE
VZV IGG SER IA-ACNC: 2600 INDEX
WBC # BLD AUTO: 15.4 X10E3/UL (ref 3.4–10.8)

## 2024-04-15 LAB
CFDNA.FET/CFDNA.TOTAL SFR FETUS: NORMAL %
CITATION REF LAB TEST: NORMAL
FET 13+18+21+X+Y ANEUP PLAS.CFDNA: NEGATIVE
FET CHR 21 TS PLAS.CFDNA QL: NEGATIVE
FET SEX PLAS.CFDNA DOSAGE CFDNA: NORMAL
FET TS 13 RISK PLAS.CFDNA QL: NEGATIVE
FET TS 18 RISK WBC.DNA+CFDNA QL: NEGATIVE
GA EST FROM CONCEPTION DATE: NORMAL D
LAB CORP GESTATIONAL AGE: YES
LAB CORP POSITIVE PREDICTIVE VALUE: NORMAL
LAB CORP TEST LIMITATIONS: NORMAL
LAB DIRECTOR NAME PROVIDER: NORMAL
LAB DIRECTOR NAME PROVIDER: NORMAL
LABCORP NEGATIVE PREDICTIVE VALUE: NORMAL
LABCORP PERFORMANCE CHARACTERISTICS: NORMAL
LABORATORY COMMENT REPORT: NORMAL
Lab: NORMAL
REF LAB TEST METHOD: NORMAL
TEST PERFORMANCE INFO SPEC: NORMAL

## 2024-04-16 ENCOUNTER — HOSPITAL ENCOUNTER (OUTPATIENT)
Dept: PERINATAL CARE | Facility: HOSPITAL | Age: 34
Discharge: HOME | End: 2024-04-16
Attending: OBSTETRICS & GYNECOLOGY
Payer: COMMERCIAL

## 2024-04-16 DIAGNOSIS — Z36.82 ENCOUNTER FOR (NT) NUCHAL TRANSLUCENCY SCAN: ICD-10-CM

## 2024-04-16 DIAGNOSIS — O36.80X0 ENCOUNTER TO DETERMINE FETAL VIABILITY OF PREGNANCY, SINGLE OR UNSPECIFIED FETUS: Primary | ICD-10-CM

## 2024-04-16 DIAGNOSIS — Z3A.13 13 WEEKS GESTATION OF PREGNANCY: ICD-10-CM

## 2024-04-16 LAB
CITATION REF LAB TEST: NORMAL
CLINICAL INFO: NORMAL
ETHNIC BACKGROUND STATED: NORMAL
GENE DIS ANL CARRIER INTERP-IMP: NORMAL
GENE MUT TESTED BLD/T: NORMAL
LAB DIRECTOR NAME PROVIDER: NORMAL
LABCORP INDICATION: NORMAL
RECOMMENDATION PATIENT DOC-IMP: NORMAL
REF LAB TEST METHOD: NORMAL
SERVICE CMNT-IMP: NORMAL
SMN1 GENE MUT ANL BLD/T: NORMAL
SPECIMEN SOURCE: NORMAL

## 2024-04-16 PROCEDURE — 76813 OB US NUCHAL MEAS 1 GEST: CPT

## 2024-04-20 ENCOUNTER — TELEPHONE (OUTPATIENT)
Dept: OTHER | Facility: OTHER | Age: 34
End: 2024-04-20

## 2024-04-20 NOTE — TELEPHONE ENCOUNTER
"Pt stated, \"I have noticed I am getting multiple charges in my bank account from the office. I don't understand why this is. Please have the office call me.\"    Please call pt when office reopens   "

## 2024-04-22 NOTE — TELEPHONE ENCOUNTER
4/22/24-[facility] pls call [facility] Billing Dept at [phone]

## 2024-04-23 NOTE — TELEPHONE ENCOUNTER
Patient called back regarding account charges. Relayed previous info & provided [facility] billing ph#. Pt verbalized understanding.

## 2024-04-24 LAB
CFTR FULL MUT ANL BLD/T SEQ: NORMAL
CITATION REF LAB TEST: NORMAL
CLINICAL INFO: NORMAL
ETHNIC BACKGROUND STATED: NORMAL
GENE DIS ANL CARRIER INTERP-IMP: NORMAL
LAB DIRECTOR NAME PROVIDER: NORMAL
LC INDICATION: NORMAL
RECOMMENDATION PATIENT DOC-IMP: NORMAL
REF LAB TEST METHOD: NORMAL
SERVICE CMNT-IMP: NORMAL
SPECIMEN SOURCE: NORMAL

## 2024-05-06 LAB
CITATION REF LAB TEST: NORMAL
CLINICAL INFO: NORMAL
ETHNIC BACKGROUND STATED: NORMAL
FMR1 GENE CGG RPT BLD/T QL: NORMAL
GENE DIS ANL CARRIER INTERP-IMP: NORMAL
LAB DIRECTOR NAME PROVIDER: NORMAL
LC INDICATION: NORMAL
RECOMMENDATION PATIENT DOC-IMP: NORMAL
REF LAB TEST METHOD: NORMAL
SERVICE CMNT-IMP: NORMAL
SPECIMEN SOURCE: NORMAL

## 2024-05-08 ENCOUNTER — ROUTINE PRENATAL (OUTPATIENT)
Dept: OBSTETRICS AND GYNECOLOGY | Facility: CLINIC | Age: 34
End: 2024-05-08
Payer: COMMERCIAL

## 2024-05-08 VITALS
BODY MASS INDEX: 24.32 KG/M2 | WEIGHT: 146 LBS | HEIGHT: 65 IN | SYSTOLIC BLOOD PRESSURE: 110 MMHG | DIASTOLIC BLOOD PRESSURE: 72 MMHG

## 2024-05-08 DIAGNOSIS — Z36.9 ANTENATAL SCREENING ENCOUNTER: Primary | ICD-10-CM

## 2024-05-08 PROCEDURE — 0502F SUBSEQUENT PRENATAL CARE: CPT | Performed by: OBSTETRICS & GYNECOLOGY

## 2024-06-06 ENCOUNTER — ROUTINE PRENATAL (OUTPATIENT)
Dept: OBSTETRICS AND GYNECOLOGY | Facility: CLINIC | Age: 34
End: 2024-06-06
Payer: COMMERCIAL

## 2024-06-06 VITALS
WEIGHT: 154 LBS | HEIGHT: 65 IN | BODY MASS INDEX: 25.66 KG/M2 | SYSTOLIC BLOOD PRESSURE: 110 MMHG | DIASTOLIC BLOOD PRESSURE: 68 MMHG

## 2024-06-06 DIAGNOSIS — N89.8 VAGINAL DISCHARGE DURING PREGNANCY IN SECOND TRIMESTER: ICD-10-CM

## 2024-06-06 DIAGNOSIS — Z34.00 PRIMIGRAVIDA, ANTEPARTUM: Primary | ICD-10-CM

## 2024-06-06 DIAGNOSIS — O26.892 VAGINAL DISCHARGE DURING PREGNANCY IN SECOND TRIMESTER: ICD-10-CM

## 2024-06-06 PROBLEM — F41.9 ANXIETY: Status: ACTIVE | Noted: 2024-06-06

## 2024-06-06 PROBLEM — C43.71 MALIGNANT MELANOMA OF RIGHT LOWER EXTREMITY INCLUDING HIP (CMS/HCC): Status: ACTIVE | Noted: 2024-02-23

## 2024-06-06 PROBLEM — J45.990 ASTHMA, EXERCISE INDUCED: Status: ACTIVE | Noted: 2024-06-06

## 2024-06-06 PROCEDURE — 0502F SUBSEQUENT PRENATAL CARE: CPT | Performed by: OBSTETRICS & GYNECOLOGY

## 2024-06-06 NOTE — PROGRESS NOTES
"ROUTINE OB VISIT     HPI     Kelli Hernandez is a 34 y.o. female being seen today for her obstetrical visit. She is at 20w2d gestation.      Had some spotting a few days ago that was dark brown and light pink spotting.   Stopped this AM  Has been clear mucus bUt this AM was had a tint of yellow/ brown and thinks discharge has an odor.  No sex recenrly.  Did lift something heavy a few days ago and did some pilates and yoga last week.  Also went on field trip for school and was on her feet a lot last week which caused leg swelling.     No ctx/ cramping. +FM                Review of Systems     ROS performed and negative unless noted in HPI          Objective            Visit Vitals  /68 (BP Location: Right upper arm, Patient Position: Sitting)   Ht 1.651 m (5' 5\")   Wt 69.9 kg (154 lb)   LMP 01/16/2024   BMI 25.63 kg/m²        FHT:  150 BPM       SSE:   cervix closed. No bleeding. No significant appearing discharge.  No pooling.    Cervix closed on palpation.                 Assessment/ Plan     Problem List Items Addressed This Visit          Obstetric    Primigravida, antepartum - Primary    Overview     [x]PNL  []CF/ SMA  [x]NIPT  [x]NT US  [-]AFP: declined  []Anatomy US  []28 wk labs  []TDAP  []Flu Shot  []GBS                   Neg NIPT  Anatomy US 6/14  RTO 4wks for OB visit  Reviewed left sided pain is likely RL pain due to its nature and that it occurrs with movement.   Reviewed bleeding precautions/ reasons to call              Dorothy Perez, DO   "

## 2024-06-09 LAB
A VAGINAE DNA VAG QL NAA+PROBE: NORMAL SCORE
BVAB2 DNA VAG QL NAA+PROBE: NORMAL SCORE
C ALBICANS DNA VAG QL NAA+PROBE: NEGATIVE
C GLABRATA DNA VAG QL NAA+PROBE: NEGATIVE
C TRACH DNA VAG QL NAA+PROBE: NEGATIVE
MEGA1 DNA VAG QL NAA+PROBE: NORMAL SCORE
N GONORRHOEA DNA VAG QL NAA+PROBE: NEGATIVE
T VAGINALIS DNA VAG QL NAA+PROBE: NEGATIVE

## 2024-06-14 ENCOUNTER — HOSPITAL ENCOUNTER (OUTPATIENT)
Dept: PERINATAL CARE | Facility: HOSPITAL | Age: 34
Discharge: HOME | End: 2024-06-14
Attending: OBSTETRICS & GYNECOLOGY
Payer: COMMERCIAL

## 2024-06-14 DIAGNOSIS — Z3A.21 21 WEEKS GESTATION OF PREGNANCY: ICD-10-CM

## 2024-06-14 DIAGNOSIS — O46.92 SECOND TRIMESTER BLEEDING: Primary | ICD-10-CM

## 2024-06-14 DIAGNOSIS — Z36.86 ENCOUNTER FOR SCREENING FOR RISK OF PRE-TERM LABOR: ICD-10-CM

## 2024-06-14 DIAGNOSIS — Z36.3 ENCOUNTER FOR ROUTINE SCREENING FOR MALFORMATION USING ULTRASONICS: ICD-10-CM

## 2024-06-14 PROCEDURE — 76811 OB US DETAILED SNGL FETUS: CPT

## 2024-06-18 ENCOUNTER — DOCUMENTATION (OUTPATIENT)
Dept: OBSTETRICS AND GYNECOLOGY | Facility: CLINIC | Age: 34
End: 2024-06-18
Payer: COMMERCIAL

## 2024-06-18 NOTE — PROGRESS NOTES
Call from patient    She advised:  No spotting since Friday after anatomy scan  This morning woke up with brownish discharge also with a yeast smell  No itching or burning  Only notices with wiping  No intercourse this week  Looks similar to prior swab done in office  No cramps    I advised:  Continue to monitor s/s and call back if turns to red bleeding or severe abdominal cramps  Come to BM office tomorrow to be seen    Patient verbalized understanding of plan of care.

## 2024-06-19 ENCOUNTER — ROUTINE PRENATAL (OUTPATIENT)
Dept: OBSTETRICS AND GYNECOLOGY | Facility: CLINIC | Age: 34
End: 2024-06-19
Payer: COMMERCIAL

## 2024-06-19 VITALS
HEART RATE: 86 BPM | SYSTOLIC BLOOD PRESSURE: 105 MMHG | BODY MASS INDEX: 26.52 KG/M2 | WEIGHT: 159.2 LBS | HEIGHT: 65 IN | OXYGEN SATURATION: 100 % | DIASTOLIC BLOOD PRESSURE: 66 MMHG | RESPIRATION RATE: 16 BRPM

## 2024-06-19 DIAGNOSIS — Z36.9 ANTENATAL SCREENING ENCOUNTER: Primary | ICD-10-CM

## 2024-06-19 DIAGNOSIS — O46.92 VAGINAL BLEEDING IN PREGNANCY, SECOND TRIMESTER: ICD-10-CM

## 2024-06-19 DIAGNOSIS — Z20.2 CONTACT WITH AND (SUSPECTED) EXPOSURE TO INFECTIONS WITH A PREDOMINANTLY SEXUAL MODE OF TRANSMISSION: ICD-10-CM

## 2024-06-19 DIAGNOSIS — Z34.80 SUPERVISION OF OTHER NORMAL PREGNANCY, ANTEPARTUM: ICD-10-CM

## 2024-06-19 PROCEDURE — 0502F SUBSEQUENT PRENATAL CARE: CPT | Performed by: OBSTETRICS & GYNECOLOGY

## 2024-06-19 NOTE — PRENATAL NOTE
Girl  spotting was brought in for this gtt given for next visit pox 100% joseph us was neg they were aware she was spotting and found no reason for this on scan post placenta I see blood brown mucous  cvx closed  I did a cx  I find no reason for this at this time light activity only and will fu in 2-4 wks

## 2024-06-21 LAB
A VAGINAE DNA VAG QL NAA+PROBE: NORMAL SCORE
BVAB2 DNA VAG QL NAA+PROBE: NORMAL SCORE
C ALBICANS DNA VAG QL NAA+PROBE: NEGATIVE
C GLABRATA DNA VAG QL NAA+PROBE: NEGATIVE
C TRACH DNA SPEC QL NAA+PROBE: NEGATIVE
MEGA1 DNA VAG QL NAA+PROBE: NORMAL SCORE
N GONORRHOEA DNA VAG QL NAA+PROBE: NEGATIVE
T VAGINALIS DNA VAG QL NAA+PROBE: NEGATIVE

## 2024-07-10 ENCOUNTER — HOSPITAL ENCOUNTER (EMERGENCY)
Facility: HOSPITAL | Age: 34
Discharge: HOME | End: 2024-07-10
Attending: STUDENT IN AN ORGANIZED HEALTH CARE EDUCATION/TRAINING PROGRAM | Admitting: STUDENT IN AN ORGANIZED HEALTH CARE EDUCATION/TRAINING PROGRAM
Payer: COMMERCIAL

## 2024-07-10 ENCOUNTER — APPOINTMENT (EMERGENCY)
Dept: RADIOLOGY | Facility: HOSPITAL | Age: 34
End: 2024-07-10
Payer: COMMERCIAL

## 2024-07-10 ENCOUNTER — DOCUMENTATION (OUTPATIENT)
Dept: OBSTETRICS AND GYNECOLOGY | Facility: CLINIC | Age: 34
End: 2024-07-10
Payer: COMMERCIAL

## 2024-07-10 VITALS
HEIGHT: 65 IN | OXYGEN SATURATION: 98 % | TEMPERATURE: 97.9 F | HEART RATE: 82 BPM | SYSTOLIC BLOOD PRESSURE: 112 MMHG | DIASTOLIC BLOOD PRESSURE: 61 MMHG | BODY MASS INDEX: 26.66 KG/M2 | WEIGHT: 160 LBS | RESPIRATION RATE: 18 BRPM

## 2024-07-10 DIAGNOSIS — R07.9 ACUTE CHEST PAIN: Primary | ICD-10-CM

## 2024-07-10 DIAGNOSIS — R22.43 LOCALIZED SWELLING OF BOTH LOWER LEGS: ICD-10-CM

## 2024-07-10 DIAGNOSIS — M25.511 ACUTE PAIN OF RIGHT SHOULDER: ICD-10-CM

## 2024-07-10 LAB
ALBUMIN SERPL-MCNC: 3.7 G/DL (ref 3.5–5.7)
ALP SERPL-CCNC: 60 IU/L (ref 34–125)
ALT SERPL-CCNC: 12 IU/L (ref 7–52)
ANION GAP SERPL CALC-SCNC: 9 MEQ/L (ref 3–15)
AST SERPL-CCNC: 15 IU/L (ref 13–39)
BASOPHILS # BLD: 0.06 K/UL (ref 0.01–0.1)
BASOPHILS NFR BLD: 0.3 %
BILIRUB SERPL-MCNC: 0.2 MG/DL (ref 0.3–1.2)
BILIRUB UR QL STRIP.AUTO: NEGATIVE MG/DL
BNP SERPL-MCNC: 45 PG/ML
BUN SERPL-MCNC: 10 MG/DL (ref 7–25)
CALCIUM SERPL-MCNC: 9.1 MG/DL (ref 8.6–10.3)
CHLORIDE SERPL-SCNC: 102 MEQ/L (ref 98–107)
CLARITY UR REFRACT.AUTO: CLEAR
CO2 SERPL-SCNC: 24 MEQ/L (ref 21–31)
COLOR UR AUTO: COLORLESS
CREAT SERPL-MCNC: 0.5 MG/DL (ref 0.6–1.2)
D DIMER PPP IA.FEU-MCNC: 0.5 UG/ML FEU (ref 0–0.5)
DIFFERENTIAL METHOD BLD: ABNORMAL
EGFRCR SERPLBLD CKD-EPI 2021: >60 ML/MIN/1.73M*2
EOSINOPHIL # BLD: 0.17 K/UL (ref 0.04–0.36)
EOSINOPHIL NFR BLD: 1 %
ERYTHROCYTE [DISTWIDTH] IN BLOOD BY AUTOMATED COUNT: 13.8 % (ref 11.7–14.4)
GLUCOSE SERPL-MCNC: 114 MG/DL (ref 70–99)
GLUCOSE UR STRIP.AUTO-MCNC: ABNORMAL MG/DL
HCT VFR BLD AUTO: 35.5 % (ref 35–45)
HGB BLD-MCNC: 11.9 G/DL (ref 11.8–15.7)
HGB UR QL STRIP.AUTO: NEGATIVE
IMM GRANULOCYTES # BLD AUTO: 0.52 K/UL (ref 0–0.08)
IMM GRANULOCYTES NFR BLD AUTO: 3 %
KETONES UR STRIP.AUTO-MCNC: NEGATIVE MG/DL
LEUKOCYTE ESTERASE UR QL STRIP.AUTO: NEGATIVE
LYMPHOCYTES # BLD: 2.27 K/UL (ref 1.2–3.5)
LYMPHOCYTES NFR BLD: 12.9 %
MCH RBC QN AUTO: 30.9 PG (ref 28–33.2)
MCHC RBC AUTO-ENTMCNC: 33.5 G/DL (ref 32.2–35.5)
MCV RBC AUTO: 92.2 FL (ref 83–98)
MONOCYTES # BLD: 1.3 K/UL (ref 0.28–0.8)
MONOCYTES NFR BLD: 7.4 %
NEUTROPHILS # BLD: 13.23 K/UL (ref 1.7–7)
NEUTS SEG NFR BLD: 75.4 %
NITRITE UR QL STRIP.AUTO: NEGATIVE
NRBC BLD-RTO: 0 %
PDW BLD AUTO: 9 FL (ref 9.4–12.3)
PH UR STRIP.AUTO: 6.5 [PH]
PLATELET # BLD AUTO: 329 K/UL (ref 150–369)
POTASSIUM SERPL-SCNC: 3.7 MEQ/L (ref 3.5–5.1)
PROT SERPL-MCNC: 6.8 G/DL (ref 6–8.2)
PROT UR QL STRIP.AUTO: NEGATIVE
RBC # BLD AUTO: 3.85 M/UL (ref 3.93–5.22)
SODIUM SERPL-SCNC: 135 MEQ/L (ref 136–145)
SP GR UR REFRACT.AUTO: 1.01
TROPONIN I SERPL HS-MCNC: 7.8 PG/ML
TROPONIN I SERPL HS-MCNC: 9.1 PG/ML
UROBILINOGEN UR STRIP-ACNC: 0.2 EU/DL
WBC # BLD AUTO: 17.55 K/UL (ref 3.8–10.5)

## 2024-07-10 PROCEDURE — 83880 ASSAY OF NATRIURETIC PEPTIDE: CPT

## 2024-07-10 PROCEDURE — 59025 FETAL NON-STRESS TEST: CPT

## 2024-07-10 PROCEDURE — 93005 ELECTROCARDIOGRAM TRACING: CPT | Mod: 59 | Performed by: STUDENT IN AN ORGANIZED HEALTH CARE EDUCATION/TRAINING PROGRAM

## 2024-07-10 PROCEDURE — 85379 FIBRIN DEGRADATION QUANT: CPT

## 2024-07-10 PROCEDURE — 96360 HYDRATION IV INFUSION INIT: CPT

## 2024-07-10 PROCEDURE — 93970 EXTREMITY STUDY: CPT

## 2024-07-10 PROCEDURE — 81003 URINALYSIS AUTO W/O SCOPE: CPT

## 2024-07-10 PROCEDURE — 99284 EMERGENCY DEPT VISIT MOD MDM: CPT | Mod: 25

## 2024-07-10 PROCEDURE — 80053 COMPREHEN METABOLIC PANEL: CPT | Performed by: STUDENT IN AN ORGANIZED HEALTH CARE EDUCATION/TRAINING PROGRAM

## 2024-07-10 PROCEDURE — 3E0337Z INTRODUCTION OF ELECTROLYTIC AND WATER BALANCE SUBSTANCE INTO PERIPHERAL VEIN, PERCUTANEOUS APPROACH: ICD-10-PCS | Performed by: STUDENT IN AN ORGANIZED HEALTH CARE EDUCATION/TRAINING PROGRAM

## 2024-07-10 PROCEDURE — 36415 COLL VENOUS BLD VENIPUNCTURE: CPT | Performed by: STUDENT IN AN ORGANIZED HEALTH CARE EDUCATION/TRAINING PROGRAM

## 2024-07-10 PROCEDURE — 84484 ASSAY OF TROPONIN QUANT: CPT | Performed by: STUDENT IN AN ORGANIZED HEALTH CARE EDUCATION/TRAINING PROGRAM

## 2024-07-10 PROCEDURE — 96361 HYDRATE IV INFUSION ADD-ON: CPT | Mod: 59

## 2024-07-10 PROCEDURE — 71046 X-RAY EXAM CHEST 2 VIEWS: CPT

## 2024-07-10 PROCEDURE — 85025 COMPLETE CBC W/AUTO DIFF WBC: CPT | Performed by: STUDENT IN AN ORGANIZED HEALTH CARE EDUCATION/TRAINING PROGRAM

## 2024-07-10 PROCEDURE — 25800000 HC PHARMACY IV SOLUTIONS

## 2024-07-10 PROCEDURE — 84484 ASSAY OF TROPONIN QUANT: CPT | Mod: 91 | Performed by: STUDENT IN AN ORGANIZED HEALTH CARE EDUCATION/TRAINING PROGRAM

## 2024-07-10 RX ORDER — CETIRIZINE HYDROCHLORIDE 10 MG/1
10 TABLET ORAL DAILY
COMMUNITY

## 2024-07-10 RX ADMIN — SODIUM CHLORIDE 500 ML: 9 INJECTION, SOLUTION INTRAVENOUS at 14:48

## 2024-07-10 ASSESSMENT — ENCOUNTER SYMPTOMS
FEVER: 0
VOMITING: 0
HEADACHES: 0
SPEECH DIFFICULTY: 0
NECK PAIN: 0
FLANK PAIN: 0
BACK PAIN: 0
SHORTNESS OF BREATH: 1
ABDOMINAL PAIN: 0
FREQUENCY: 0
HEMATURIA: 0
DIARRHEA: 0
DYSURIA: 0
AGITATION: 0
WEAKNESS: 0
ARTHRALGIAS: 1
SEIZURES: 0
NAUSEA: 0
COLOR CHANGE: 0
COUGH: 0
DIFFICULTY URINATING: 0
MYALGIAS: 1
ACTIVITY CHANGE: 0

## 2024-07-10 NOTE — NURSING NOTE
LDR - patient denies any gush or LOF, vaginal bleeding, any H/A today or visual changes. Patient states she has had a history of migraines during the pregnancy but states none today. Patient states she has felt (+) FM. Reactive NST along with fetal movement audible and visually noted during NST.

## 2024-07-10 NOTE — ED ATTESTATION NOTE
I personally saw and evaluated the patient, participated in the management, and agree with the findings in the note above except as where stated. The physician assistant and I discussed the case, workup, and disposition.     34-year-old G1, P0 currently approximately 25 weeks pregnant presenting for evaluation of chest pain, shoulder blade pain and bilateral leg swelling left greater than right.  Patient reports that she is having pain to the right shoulder blade about 2 weeks ago.  She states that the pain is constant.  She thought it was worse with movement but does not feel it is so now.  She states that she has been placing lidocaine patches.  Last night the pain started radiating into her chest.  Does not report any worsening pain with breathing.  Denies fevers, chills, shortness of breath, nausea, vomiting.  Reports normal fetal movement.  Denies any abdominal pain, vaginal bleeding, loss of fluids, dysuria.  She reports that she started noticing leg swelling last night.  She states this occurs if she is on her feet for too long but has not been recently.  She reports left lower extremity swelling greater than right lower extremity.  Denies history of DVT or PE.  Denies tobacco use.  Reports that she was in a car ride to [place] about 1 month ago.  Denies other travel.    Patient does report that she had elevated levels of mold in her system and was on a binder to remove the mold but stopped this when she became pregnant.    AAOx3 resting comfortably in no acute distress  Heart is regular rate and rhythm normal S1-S2 no murmurs rubs or gallops  Lungs are clear to auscultation bilaterally  Abdomen gravid, soft, nontender in all quadrants  2+ nonpitting edema to left lower extremity, 1+ nonpitting edema to the right lower extremity  2+ DP pulses  No lower extremity erythema, warmth, tenderness  No reproducible chest or scapular tenderness  No evidence of rash, mild erythema to the scapula    34-year-old  female presenting for evaluation of chest pain and scapular pain and unilateral leg swelling.  Patient with negative D-dimer.  Ultrasound of lower extremities pending.       Mary Herrera MD  07/10/24 0514     patient with left sided weakness

## 2024-07-10 NOTE — PROGRESS NOTES
Call back from patient.      She advised:  Right shoulder blade pain is at it's worst and right chest pain comes and goes and also has right foot pain.    Received my voicemail with instructions to proceed to ER and plans to go to ER after work    I advised:  Continue to monitor s/s and proceed to ER as soon as able    Patient verbalized understanding of plan of care and will proceed to ER

## 2024-07-10 NOTE — DISCHARGE INSTRUCTIONS
Please return to the emergency department immediately if you develop fever, chills, chest pain, difficulty breathing, coughing up of blood, palpitations, headache, dizziness, lightheadedness, syncope, difficulty with urination or any other symptom that is concerning to you.    Please call your primary care provider and arrange for follow-up/reevaluation within 24 to 48 hours.    Please call your OB/GYN arrange for follow-up/reevaluation.  You may need a repeat ultrasound in approximately 5 to 7 days.

## 2024-07-10 NOTE — ED PROVIDER NOTES
Emergency Medicine Note  HPI   HISTORY OF PRESENT ILLNESS     Patient is a 34-year-old female,  25 weeks pregnant presenting to the emergency department with chest pain and bilateral leg swelling, left greater than right.  Patient states that she started with right shoulder pain approximately x 2 weeks ago.  She states last night the pain went into her right chest.  She states it is intermittent in nature.  She does not note a relation to movement or breathing.  Patient states that she was being treated for mold poisoning back in December and then unexpectedly got pregnant so had to stop her treatment.  Patient has been using IcyHot at home with minimal improvement in symptoms.  Patient states that last night she noted bilateral lower extremity edema.  She called her OB/GYN with the symptoms and was directed to the emergency department for further evaluation.  Denies any recent illnesses or known sick contacts.  Denies any recent travel, prolonged ill immobilization or hospitalization.  Denies fever, chills, abdominal pain, N/V/D, urinary frequency/urgency, dysuria, headache, dizziness, lightheadedness and syncope. Denies a personal or family history of blood clotting disorders.  Denies any vaginal bleeding and loss of fluids.  Patient's OB/GYN is Dr. Keller.      History provided by:  Patient and spouse        Patient History   PAST HISTORY     Reviewed from Nursing Triage:       Past Medical History:   Diagnosis Date    Asthma     Skin cancer        Past Surgical History:   Procedure Laterality Date    SKIN SURGERY         Family History   Problem Relation Age of Onset    Heart disease Paternal Grandfather     Emphysema Paternal Grandmother     Diabetes Maternal Grandmother     Gallbladder disease Maternal Grandmother     Skin cancer Maternal Grandmother     Skin cancer Maternal Grandfather     Hodgkin's lymphoma Maternal Grandfather        Social History     Tobacco Use    Smoking status: Former     Types:  Cigarettes    Smokeless tobacco: Never   Vaping Use    Vaping Use: Never used   Substance Use Topics    Alcohol use: Not Currently    Drug use: Never         Review of Systems   REVIEW OF SYSTEMS     Review of Systems   Constitutional:  Negative for activity change and fever.   Respiratory:  Positive for shortness of breath. Negative for cough.    Cardiovascular:  Positive for chest pain and leg swelling.   Gastrointestinal:  Negative for abdominal pain, diarrhea, nausea and vomiting.   Genitourinary:  Negative for difficulty urinating, dysuria, flank pain, frequency, hematuria and urgency.   Musculoskeletal:  Positive for arthralgias and myalgias. Negative for back pain and neck pain.   Skin:  Negative for color change.   Neurological:  Negative for seizures, syncope, speech difficulty, weakness and headaches.   Psychiatric/Behavioral:  Negative for agitation and behavioral problems.          VITALS     ED Vitals      Date/Time Temp Pulse Resp BP SpO2 [facility]   07/10/24 1824 -- -- 18 112/61 98 % [facility]   07/10/24 1645 -- 82 17 111/57 98 % [facility]   07/10/24 1540 -- 86 24 112/61 96 % [facility]   07/10/24 1409 36.6 °C (97.9 °F) -- -- -- -- PIA   07/10/24 1407 -- 102 16 110/76 97 %           Pulse Ox %: 97 % (07/10/24 1407)  Pulse Ox Interpretation: Normal (07/10/24 1407)           Physical Exam   PHYSICAL EXAM     Physical Exam  Vitals and nursing note reviewed.   Constitutional:       Appearance: She is well-developed.      Comments: Patient is in no acute distress.  She answers questions quickly, appropriately and speaks in full sentences.  She makes good eye contact on examination.   HENT:      Head: Normocephalic and atraumatic.      Nose: Nose normal.      Mouth/Throat:      Mouth: Mucous membranes are moist.      Pharynx: Oropharynx is clear.   Eyes:      Conjunctiva/sclera: Conjunctivae normal.   Cardiovascular:      Rate and Rhythm: Normal rate and regular rhythm.      Pulses: Normal pulses.   Pulmonary:      Effort:  Pulmonary effort is normal. No respiratory distress.      Breath sounds: Normal breath sounds. No wheezing, rhonchi or rales.   Abdominal:      General: There is no distension.      Palpations: Abdomen is soft. There is no mass.      Tenderness: There is no abdominal tenderness.      Comments: Gravid abdomen.    Musculoskeletal:         General: No tenderness or deformity. Normal range of motion.      Cervical back: Normal range of motion.      Right lower leg: Edema present.      Left lower leg: Edema present.      Comments: Right, 1+ nonpitting lower extremity edema.  Left, 2+ nonpitting lower extremity edema.  Neurovascularly intact bilateral lower extremity.  Pulses intact bilateral lower extremity.  No overlying skin erythema or warmth.   Skin:     General: Skin is warm and dry.      Capillary Refill: Capillary refill takes less than 2 seconds.   Neurological:      Mental Status: She is alert. Mental status is at baseline.   Psychiatric:         Behavior: Behavior normal.           PROCEDURES     Procedures     DATA     Results       Procedure Component Value Units Date/Time    UA w/ reflex culture (ED Only) [[id number]]  (Abnormal) Collected: 07/10/24 1712    Specimen: Urine, Clean Catch Updated: 07/10/24 1722    Narrative:      The following orders were created for panel order UA w/ reflex culture (ED Only).  Procedure                               Abnormality         Status                     ---------                               -----------         ------                     UA Reflex to Culture (Ma...[[id number]]  Abnormal            Final result                 Please view results for these tests on the individual orders.    UA Reflex to Culture (Macroscopic) [[id number]]  (Abnormal) Collected: 07/10/24 1712    Specimen: Urine, Clean Catch Updated: 07/10/24 1722     Color, Urine Colorless     Clarity, Urine Clear     Specific Gravity, Urine 1.008     pH, Urine 6.5     Leukocyte Esterase Negative      Comment: Results can be falsely negative due to high specific gravity, some antibiotics, glucose >3 g/dl, or WBC other than neutrophils.        Nitrite, Urine Negative     Protein, Urine Negative     Glucose, Urine Trace mg/dL      Ketones, Urine Negative mg/dL      Urobilinogen, Urine 0.2 EU/dL      Bilirubin, Urine Negative mg/dL      Blood, Urine Negative     Comment: The sensitivity of the occult blood test is equivalent to approximately 4 intact RBC/HPF.       B-type natriuretic peptide [[id number]]  (Normal) Collected: 07/10/24 1413    Specimen: Blood, Venous Updated: 07/10/24 1534     BNP 45 pg/mL     HS Troponin (with 2 hour reflex) [[id number]]  (Normal) Collected: 07/10/24 1413    Specimen: Blood, Venous Updated: 07/10/24 1513     High Sens Troponin I 7.8 pg/mL     Comprehensive metabolic panel [[id number]]  (Abnormal) Collected: 07/10/24 1413    Specimen: Blood, Venous Updated: 07/10/24 1511     Sodium 135 mEQ/L      Potassium 3.7 mEQ/L      Comment: Results obtained on plasma. Plasma Potassium values may be up to 0.4 mEQ/L less than serum values. The differences may be greater for patients with high platelet or white cell counts.        Chloride 102 mEQ/L      CO2 24 mEQ/L      BUN 10 mg/dL      Creatinine 0.5 mg/dL      Glucose 114 mg/dL      Calcium 9.1 mg/dL      AST (SGOT) 15 IU/L      ALT (SGPT) 12 IU/L      Alkaline Phosphatase 60 IU/L      Total Protein 6.8 g/dL      Comment: Test performed on plasma which typically contains approximately 0.4 g/dL more protein than serum.        Albumin 3.7 g/dL      Bilirubin, Total 0.2 mg/dL      eGFR >60.0 mL/min/1.73m*2      Comment: Calculation based on the Chronic Kidney Disease Epidemiology Collaboration (CKD-EPI) equation refit without adjustment for race.        Anion Gap 9 mEQ/L     D-dimer, quantitative [[id number]]  (Normal) Collected: 07/10/24 1413    Specimen: Blood, Venous Updated: 07/10/24 1500     D-Dimer, Quant 0.50 ug/mL FEU      Comment: The  D-Dimer assay can be used as an aid in the diagnosis of DVT or PE. The test can not be used by itself to exclude DVT or PE. When used as a diagnostic aid, the cutoff value is the same as the reference range: <0.5 ug/ml FEU.       CBC and differential [[id number]]  (Abnormal) Collected: 07/10/24 1413    Specimen: Blood, Venous Updated: 07/10/24 1438     WBC 17.55 K/uL      RBC 3.85 M/uL      Hemoglobin 11.9 g/dL      Hematocrit 35.5 %      MCV 92.2 fL      MCH 30.9 pg      MCHC 33.5 g/dL      RDW 13.8 %      Platelets 329 K/uL      MPV 9.0 fL      Differential Type Auto     nRBC 0.0 %      Immature Granulocytes 3.0 %      Neutrophils 75.4 %      Lymphocytes 12.9 %      Monocytes 7.4 %      Eosinophils 1.0 %      Basophils 0.3 %      Immature Granulocytes, Absolute 0.52 K/uL      Neutrophils, Absolute 13.23 K/uL      Lymphocytes, Absolute 2.27 K/uL      Monocytes, Absolute 1.30 K/uL      Eosinophils, Absolute 0.17 K/uL      Basophils, Absolute 0.06 K/uL     [facility] Draw Panel [[id number]] Collected: 07/10/24 1413    Specimen: Blood, Venous Updated: 07/10/24 1434    Narrative:      The following orders were created for panel order [facility] Draw Panel.  Procedure                               Abnormality         Status                     ---------                               -----------         ------                     Yamli BLUE[[id number]]                                  In process                   Please view results for these tests on the individual orders.    Yamli BLUE [[id number]] Collected: 07/10/24 1413    Specimen: Blood, Venous Updated: 07/10/24 1434            Imaging Results              X-RAY CHEST 2 VIEWS (Final result)  Result time 07/10/24 17:14:00      Final result                   Impression:    IMPRESSION: No acute disease.    COMMENT: Frontal and lateral views of the chest are obtained. No prior studies  are available for review. Cardiac size is normal.  Lungs are clear.  No  evidence  of a pleural effusion, pneumothorax or pneumomediastinum.                   Narrative:    CLINICAL HISTORY: Chest pain and SOB                                       US venous leg bilateral (Final result)  Result time 07/10/24 16:31:31      Final result                   Impression:    IMPRESSION: No femoropopliteal thrombosis.    COMMENT: A duplex Doppler study was performed of both lower extremities,  consisting of integrated two-dimensional (2D) real-time imaging color flow  Doppler and Doppler spectral analysis utilizing 5.0 MHz and 7.0 MHz linear array  probes.    RIGHT:    COMMON FEMORAL VEIN: Normal compression  DEEP FEMORAL VEIN: Patent by color Doppler.  FEMORAL VEIN: Normal compression, flow, and augmentation demonstrated on Doppler  spectral analysis.  POPLITEAL VEIN: Normal compression, flow, and augmentation demonstrated on  Doppler spectral analysis.    LEFT:    COMMON FEMORAL VEIN: Normal compression  DEEP FEMORAL VEIN: Patent by color Doppler.  FEMORAL VEIN: Normal compression, flow, and augmentation demonstrated on Doppler  spectral analysis.  POPLITEAL VEIN: Normal compression, flow, and augmentation demonstrated on  Doppler spectral analysis.    Visualized calf veins compress normally.                   Narrative:    CLINICAL INDICATION: Bilateral LE swelling, 25 weeks pregnant    COMPARISON: None                                      ECG 12 lead          Scoring tools                                  ED Course & MDM   MDM / ED COURSE / CLINICAL IMPRESSION / DISPO     Medical Decision Making  Problems Addressed:  Acute chest pain: acute illness or injury  Acute pain of right shoulder: acute illness or injury  Localized swelling of both lower legs: acute illness or injury    Amount and/or Complexity of Data Reviewed  Independent Historian: antione  External Data Reviewed: labs and notes.  Labs: ordered. Decision-making details documented in ED Course.  Radiology: ordered. Decision-making  details documented in ED Course.  ECG/medicine tests: ordered.        ED Course as of 07/10/24 2359   Wed Jul 10, 2024   1436 Patient seen and evaluated.  Complaining of x 2 weeks of right shoulder pain.  Last night she started with chest pain.  She also is noting bilateral lower extremity edema.  Will add D-dimer, urinalysis and BNP onto labs sent from triage.  Will ultrasound patient's bilateral legs. [AS]   1443 Spoke to L&D, nurse will come down and do fetal monitoring. [AS]   1448 CBC and differential(!)  Leukocytosis no anemia. [AS]   1519 High Sens Troponin I: 7.8  1st [AS]   1519 D-Dimer, Quant: 0.50  Negative [AS]   1530 Comprehensive metabolic panel(!)  Mild hyponatremia, 500 cc fluid ordered. [AS]   1538 BNP: 45 [AS]   1642 US venous leg bilateral  IMPRESSION: No femoropopliteal thrombosis. [AS]   1723 X-RAY CHEST 2 VIEWS  IMPRESSION: No acute disease. [AS]   1742 High Sens Troponin I: 9.1  2nd [AS]   1754 Updated patient and her  with test results.  Patient is now stable for discharge.  Return precautions discussed, patient verbalized her understanding.  Questions answered prior to discharge.  Recommended close follow-up with primary care provider as well as OB/GYN.  Patient is aware that she may need a repeat ultrasound in approximately 5 to 7 days. [AS]      ED Course User Index  [AS] Terra Vora PA C     Clinical Impression      Acute chest pain   Localized swelling of both lower legs   Acute pain of right shoulder     _________________       ED Disposition   Discharge                       Terra Vora PA C  07/10/24 2759

## 2024-07-12 LAB
ATRIAL RATE: 100
P AXIS: 35
PR INTERVAL: 124
QRS DURATION: 82
QT INTERVAL: 352
QTC CALCULATION(BAZETT): 454
R AXIS: 55
T WAVE AXIS: 8
VENTRICULAR RATE: 100

## 2024-07-19 ENCOUNTER — ROUTINE PRENATAL (OUTPATIENT)
Dept: OBSTETRICS AND GYNECOLOGY | Facility: CLINIC | Age: 34
End: 2024-07-19
Payer: COMMERCIAL

## 2024-07-19 VITALS
DIASTOLIC BLOOD PRESSURE: 76 MMHG | HEIGHT: 65 IN | BODY MASS INDEX: 27.79 KG/M2 | WEIGHT: 166.8 LBS | SYSTOLIC BLOOD PRESSURE: 100 MMHG

## 2024-07-19 DIAGNOSIS — Z34.80 SUPERVISION OF OTHER NORMAL PREGNANCY, ANTEPARTUM: Primary | ICD-10-CM

## 2024-07-19 DIAGNOSIS — Z36.9 ANTENATAL SCREENING ENCOUNTER: ICD-10-CM

## 2024-07-19 DIAGNOSIS — Z34.00 PRIMIGRAVIDA, ANTEPARTUM: ICD-10-CM

## 2024-07-19 PROCEDURE — 0502F SUBSEQUENT PRENATAL CARE: CPT | Performed by: OBSTETRICS & GYNECOLOGY

## 2024-07-19 NOTE — PROGRESS NOTES
"Doing gtt today  girl rto 2 wks c/o pelvic pain as \"not allowed to sleep on her back\"  reassured this is not true and can sleep however comfortable also getting belly support    "

## 2024-07-20 LAB
ABO GROUP BLD: NORMAL
BLD GP AB SCN SERPL QL: NEGATIVE
ERYTHROCYTE [DISTWIDTH] IN BLOOD BY AUTOMATED COUNT: 13.4 % (ref 11.7–15.4)
GLUCOSE 1H P 50 G GLC PO SERPL-MCNC: 85 MG/DL (ref 70–139)
HCT VFR BLD AUTO: 35.3 % (ref 34–46.6)
HGB BLD-MCNC: 12.1 G/DL (ref 11.1–15.9)
HIV 1+2 AB+HIV1 P24 AG SERPL QL IA: NON REACTIVE
MCH RBC QN AUTO: 31 PG (ref 26.6–33)
MCHC RBC AUTO-ENTMCNC: 34.3 G/DL (ref 31.5–35.7)
MCV RBC AUTO: 91 FL (ref 79–97)
PLATELET # BLD AUTO: 297 X10E3/UL (ref 150–450)
RBC # BLD AUTO: 3.9 X10E6/UL (ref 3.77–5.28)
RH BLD: POSITIVE
WBC # BLD AUTO: 14.8 X10E3/UL (ref 3.4–10.8)

## 2024-07-22 LAB — TREPONEMA PALLIDUM IGG+IGM AB [PRESENCE] IN SERUM OR PLASMA BY IMMUNOASSAY: NON REACTIVE

## 2024-07-29 DIAGNOSIS — Z36.9 ANTENATAL SCREENING ENCOUNTER: Primary | ICD-10-CM

## 2024-07-29 DIAGNOSIS — M79.605 PAIN OF LEFT LOWER EXTREMITY: ICD-10-CM

## 2024-08-02 ENCOUNTER — ROUTINE PRENATAL (OUTPATIENT)
Dept: OBSTETRICS AND GYNECOLOGY | Facility: CLINIC | Age: 34
End: 2024-08-02
Payer: COMMERCIAL

## 2024-08-02 VITALS
HEIGHT: 65 IN | WEIGHT: 171 LBS | DIASTOLIC BLOOD PRESSURE: 78 MMHG | BODY MASS INDEX: 28.49 KG/M2 | SYSTOLIC BLOOD PRESSURE: 124 MMHG

## 2024-08-02 DIAGNOSIS — Z34.93 NORMAL PREGNANCY, THIRD TRIMESTER: Primary | ICD-10-CM

## 2024-08-02 PROCEDURE — 0502F SUBSEQUENT PRENATAL CARE: CPT | Performed by: OBSTETRICS & GYNECOLOGY

## 2024-08-02 NOTE — PRENATAL NOTE
Pt doing well.  Normal 28wk labs.  Baby is moving a lot.  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 RTO 2 weeks.  Hx of melanoma.  Left skin biopsy pending.

## 2024-08-05 ENCOUNTER — DOCUMENTATION (OUTPATIENT)
Dept: OBSTETRICS AND GYNECOLOGY | Facility: CLINIC | Age: 34
End: 2024-08-05
Payer: COMMERCIAL

## 2024-08-05 NOTE — PROGRESS NOTES
FMLA ppw completed, scanned into chart and returned to pt to send to employer as directed on ppw.

## 2024-08-08 ENCOUNTER — DOCUMENTATION (OUTPATIENT)
Dept: OBSTETRICS AND GYNECOLOGY | Facility: CLINIC | Age: 34
End: 2024-08-08
Payer: COMMERCIAL

## 2024-08-08 NOTE — PROGRESS NOTES
Pt called in reporting light spotting after BM. Light pink/red/brown on toilet paper while wiping. Reports no blood in underwear, no gush, no lof, no recent intercourse and good FM.   Per on call dr. Porter continue to monitor bleeding, and FM. Call back in 2-4 hours if continues. Should lighten to pink, brown then nothing. Call with any severe abdominal pain/cramping or increase in bleeding.   Pt verbalized understanding.

## 2024-08-16 ENCOUNTER — DOCUMENTATION (OUTPATIENT)
Dept: OBSTETRICS AND GYNECOLOGY | Facility: CLINIC | Age: 34
End: 2024-08-16
Payer: COMMERCIAL

## 2024-08-16 NOTE — PROGRESS NOTES
"Call from patient.       She advised:  Woke up this morning, void and BM in bathroom and wiped and saw red on toilet paper  Put on pad and now dark red on pad  +FM  Denies LOF  Denies ctx  Had \"a little sex yesterday afternoon\"  In [place] on vacation but expected to head home at 10am    I advised:  Put on fresh pad now and monitor color and amount of bleeding for next 1-2 hours and call back to office before getting on road so I can assess bleeding  Proceed immediately to nearest ER if saturating pad, decreased FM, or ctx    Patient verbalized understanding of plan of care  Will wait to hear from patient.       "

## 2024-08-16 NOTE — PROGRESS NOTES
Call back from patient after wearing maxipad for 1 hour.     She advised:  Wore clean pad for past hour as instructed  Went to bathroom and wiped with only stringy piece of dark brown blood  Denies cramps  +FM  Is it ok to head home to [place]    I advised:  Can proceed home to [place]  Change pad again before heading back to [place] in car  Once home, call back to office if pad is saturated, decreased FM and/or ctx  Pelvic rest until appt on 08/21/24      Patient verbalized understanding of plan of care

## 2024-08-21 ENCOUNTER — ROUTINE PRENATAL (OUTPATIENT)
Dept: OBSTETRICS AND GYNECOLOGY | Facility: CLINIC | Age: 34
End: 2024-08-21
Payer: COMMERCIAL

## 2024-08-21 VITALS
BODY MASS INDEX: 29.16 KG/M2 | HEIGHT: 65 IN | WEIGHT: 175 LBS | SYSTOLIC BLOOD PRESSURE: 114 MMHG | DIASTOLIC BLOOD PRESSURE: 72 MMHG

## 2024-08-21 DIAGNOSIS — Z34.00 PRIMIGRAVIDA, ANTEPARTUM: Primary | ICD-10-CM

## 2024-08-21 DIAGNOSIS — J45.990 ASTHMA, EXERCISE INDUCED: ICD-10-CM

## 2024-08-21 DIAGNOSIS — C43.71 MALIGNANT MELANOMA OF RIGHT LOWER EXTREMITY INCLUDING HIP (CMS/HCC): ICD-10-CM

## 2024-08-21 DIAGNOSIS — N89.8 VAGINAL DISCHARGE DURING PREGNANCY IN THIRD TRIMESTER: ICD-10-CM

## 2024-08-21 DIAGNOSIS — O26.893 VAGINAL DISCHARGE DURING PREGNANCY IN THIRD TRIMESTER: ICD-10-CM

## 2024-08-21 PROCEDURE — 0502F SUBSEQUENT PRENATAL CARE: CPT | Performed by: OBSTETRICS & GYNECOLOGY

## 2024-08-21 RX ORDER — ALBUTEROL SULFATE 90 UG/1
2 INHALANT RESPIRATORY (INHALATION) EVERY 6 HOURS PRN
COMMUNITY
Start: 2024-02-23

## 2024-08-21 NOTE — PROGRESS NOTES
"ROUTINE OB VISIT     HPI     Kelli Hernandez is a 34 y.o. female being seen today for her obstetrical visit. She is at 31w1d gestation. Fetal movement: normal.   Pt has reports spotting - this has been ongoing since June - last US showed normal placenta / no collection etc.   No cramping.   Had happened a few months ago and everything looked ok.     Spotting isnt BRB like it was on that day that initially happened.      Has been wearing a pad for a full flow.     Bleeding started last Friday morning.  Bleeding had sex the day before and hadnt had any for awhile.  No cramping or pain.   No itching burning.  No yeast infection .       Baby is moving well.      No watery discharge no LOF.                          Review of Systems     ROS performed and negative unless noted in HPI          Objective            Visit Vitals  /72 (BP Location: Right upper arm, Patient Position: Sitting)   Ht 1.651 m (5' 5\")   Wt 79.4 kg (175 lb)   LMP 01/16/2024   BMI 29.12 kg/m²        FHT:     150 BPM     Uterine Size:   31    SSE:  some green/ yellow discharge.  Not brown. No BRB  cervixl closed.                  Assessment/ Plan     Problem List Items Addressed This Visit          Respiratory    Asthma, exercise induced    Relevant Medications    albuterol HFA 90 mcg/actuation inhaler       Musculoskeletal    Malignant melanoma of right lower extremity including hip (CMS/HCC)    Overview        Early stage, treated with excision only. Removed 2021. Treated by Dr. oByd at [facility].    []send placenta at time of delivery            Obstetric    Primigravida, antepartum - Primary    Overview     [x]PNL  []CF/ SMA  [x]NIPT  [x]NT US  [-]AFP: declined  [x]Anatomy US  [x]28 wk labs  []TDAP  []Flu Shot  []GBS            TDAP - wants to wait until next visit.    RTO 2 wks    Reviewed bleeding precautions/ reasons to call.              Dorothy Perez DO   "

## 2024-08-24 LAB
C TRACH RRNA SPEC QL NAA+PROBE: NEGATIVE
N GONORRHOEA RRNA SPEC QL NAA+PROBE: NEGATIVE
T VAGINALIS RRNA SPEC QL NAA+PROBE: NEGATIVE

## 2024-09-05 ENCOUNTER — ROUTINE PRENATAL (OUTPATIENT)
Dept: OBSTETRICS AND GYNECOLOGY | Facility: CLINIC | Age: 34
End: 2024-09-05
Payer: COMMERCIAL

## 2024-09-05 VITALS
DIASTOLIC BLOOD PRESSURE: 76 MMHG | WEIGHT: 181.4 LBS | SYSTOLIC BLOOD PRESSURE: 118 MMHG | BODY MASS INDEX: 30.22 KG/M2 | HEIGHT: 65 IN

## 2024-09-05 DIAGNOSIS — Z34.00 PRIMIGRAVIDA, ANTEPARTUM: Primary | ICD-10-CM

## 2024-09-05 DIAGNOSIS — C43.71 MALIGNANT MELANOMA OF RIGHT LOWER EXTREMITY INCLUDING HIP (CMS/HCC): ICD-10-CM

## 2024-09-05 PROCEDURE — 0502F SUBSEQUENT PRENATAL CARE: CPT | Performed by: OBSTETRICS & GYNECOLOGY

## 2024-09-05 PROCEDURE — 90471 IMMUNIZATION ADMIN: CPT | Performed by: OBSTETRICS & GYNECOLOGY

## 2024-09-05 PROCEDURE — 90715 TDAP VACCINE 7 YRS/> IM: CPT | Performed by: OBSTETRICS & GYNECOLOGY

## 2024-09-05 NOTE — PROGRESS NOTES
"ROUTINE OB VISIT     HPI     Kelli Hernandez is a 34 y.o. female being seen today for her obstetrical visit. She is at 33w2d gestation. Patient reports no complaints. Fetal movement: normal.   Feels like bbay favoring right side and heavier on that side.                    Review of Systems     ROS performed and negative unless noted in HPI          Objective            Visit Vitals  /76 (BP Location: Right upper arm, Patient Position: Sitting)   Ht 1.651 m (5' 5\")   Wt 82.3 kg (181 lb 6.4 oz)   LMP 01/16/2024   BMI 30.19 kg/m²        FHT:  145 BPM     Uterine Size:    33               Assessment/ Plan     Problem List Items Addressed This Visit          Musculoskeletal    Malignant melanoma of right lower extremity including hip (CMS/HCC)    Overview        Early stage, treated with excision only. Removed 2021. Treated by Dr. Boyd at [facility].    []send placenta at time of delivery            Obstetric    Primigravida, antepartum - Primary    Overview     [x]PNL  []CF/ SMA  [x]NIPT  [x]NT US  [-]AFP: declined  [x]Anatomy US  [x]28 wk labs  []TDAP  []Flu Shot  []GBS            Tdap today  RTO 2wks   Reviewed birth plan/ labor precautions  Is taking birth classes at birth center  RTO 2 wks or ABDIELN           Dorothy Perez DO   "

## 2024-09-06 ENCOUNTER — DOCUMENTATION (OUTPATIENT)
Dept: OBSTETRICS AND GYNECOLOGY | Facility: CLINIC | Age: 34
End: 2024-09-06
Payer: COMMERCIAL

## 2024-09-06 NOTE — PROGRESS NOTES
FMLA ppw completed using RTW date as 72 days after delivery as per pt directions. Ppw scanned into chart and sent to pt to complete then return to employer.

## 2024-09-10 ENCOUNTER — DOCUMENTATION (OUTPATIENT)
Dept: OBSTETRICS AND GYNECOLOGY | Facility: CLINIC | Age: 34
End: 2024-09-10
Payer: COMMERCIAL

## 2024-09-12 ENCOUNTER — DOCUMENTATION (OUTPATIENT)
Dept: OBSTETRICS AND GYNECOLOGY | Facility: CLINIC | Age: 34
End: 2024-09-12
Payer: COMMERCIAL

## 2024-09-13 ENCOUNTER — DOCUMENTATION (OUTPATIENT)
Dept: OBSTETRICS AND GYNECOLOGY | Facility: CLINIC | Age: 34
End: 2024-09-13
Payer: COMMERCIAL

## 2024-09-17 ENCOUNTER — HOSPITAL ENCOUNTER (OUTPATIENT)
Facility: HOSPITAL | Age: 34
End: 2024-09-17
Attending: OBSTETRICS & GYNECOLOGY | Admitting: OBSTETRICS & GYNECOLOGY
Payer: COMMERCIAL

## 2024-09-19 ENCOUNTER — ROUTINE PRENATAL (OUTPATIENT)
Dept: OBSTETRICS AND GYNECOLOGY | Facility: CLINIC | Age: 34
End: 2024-09-19
Payer: COMMERCIAL

## 2024-09-19 VITALS
WEIGHT: 183.6 LBS | DIASTOLIC BLOOD PRESSURE: 74 MMHG | SYSTOLIC BLOOD PRESSURE: 132 MMHG | HEIGHT: 65 IN | BODY MASS INDEX: 30.59 KG/M2

## 2024-09-19 DIAGNOSIS — C43.71 MALIGNANT MELANOMA OF RIGHT LOWER EXTREMITY INCLUDING HIP (CMS/HCC): ICD-10-CM

## 2024-09-19 DIAGNOSIS — Z34.00 PRIMIGRAVIDA, ANTEPARTUM: Primary | ICD-10-CM

## 2024-09-19 PROCEDURE — 0502F SUBSEQUENT PRENATAL CARE: CPT | Performed by: OBSTETRICS & GYNECOLOGY

## 2024-09-19 NOTE — PROGRESS NOTES
"ROUTINE OB VISIT     HPI     Kelli Hernandez is a 34 y.o. female being seen today for her obstetrical visit. She is at 35w2d gestation.    Good FM.   Has been having hiccups.   Moving a lot but lays on the right side more and more.   No LOF or Vb  No ctx.   +edema in Les no erythema/ nontender to palp.   Is feeling some pelvic pressure.   Having a hard time at work - would like work restriction note.                   Review of Systems     ROS performed and negative unless noted in HPI          Objective            Visit Vitals  /74 (BP Location: Right upper arm, Patient Position: Sitting)   Ht 1.651 m (5' 5\")   Wt 83.3 kg (183 lb 9.6 oz)   LMP 01/16/2024   BMI 30.55 kg/m²        FHT:    148 BPM     BSUS:  -BREECH with head in RUQ  LATONIA: 10.9  Biometry: EFW = 2506g = 5#8oz                 Assessment/ Plan     Problem List Items Addressed This Visit          Musculoskeletal    Malignant melanoma of right lower extremity including hip (CMS/HCC)    Overview        Early stage, treated with excision only. Removed 2021. Treated by Dr. Boyd at [facility].    []send placenta at time of delivery            Obstetric    Primigravida, antepartum - Primary    Overview     [x]PNL  []CF/ SMA  [x]NIPT  [x]NT US  [-]AFP: declined  [x]Anatomy US  [x]28 wk labs  [x]TDAP  []Flu Shot  []GBS              Other    Breech presentation    Overview     9/19: BSUS showed breech position.  Repeat scan at 36wks and then can determine if she wants version vs CS          TDAP last visit   RTO 1 wk  Will re-scan and if still breech can decide if she wants version - reviewed acupuncture or chiropractic medicine can be helpful.  Discussed spinning babies positions as well. Pt will look into resources available in her community.   Discussed options of ECV vs CS.                 Dorothy Perez DO   "

## 2024-09-23 ENCOUNTER — TELEPHONE (OUTPATIENT)
Dept: OBSTETRICS AND GYNECOLOGY | Facility: CLINIC | Age: 34
End: 2024-09-23
Payer: COMMERCIAL

## 2024-09-23 NOTE — LETTER
2024     Patient: Kelli Hernandez   YOB: 1990   Date of Visit: 2024       To Whom It May Concern:     Kelli Hernandez ( 1990) is currently pregnant with a due date of 10/22/24.    It is in my medical opinion that she be able to work from a seated position only, and have the ability to take breaks to the batroom and for hydration as often as needed.     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      If you have any questions or concerns, please don't hesitate to call.         Sincerely,        Dorothy Perez DO    CC: No Recipients

## 2024-09-25 ENCOUNTER — ROUTINE PRENATAL (OUTPATIENT)
Dept: OBSTETRICS AND GYNECOLOGY | Facility: CLINIC | Age: 34
End: 2024-09-25
Payer: COMMERCIAL

## 2024-09-25 VITALS
BODY MASS INDEX: 30.82 KG/M2 | SYSTOLIC BLOOD PRESSURE: 110 MMHG | HEIGHT: 65 IN | WEIGHT: 185 LBS | DIASTOLIC BLOOD PRESSURE: 70 MMHG

## 2024-09-25 DIAGNOSIS — Z34.93 NORMAL PREGNANCY, THIRD TRIMESTER: ICD-10-CM

## 2024-09-25 DIAGNOSIS — Z36.89 ENCOUNTER FOR OTHER SPECIFIED ANTENATAL SCREENING: Primary | ICD-10-CM

## 2024-09-25 DIAGNOSIS — Z3A.39 39 WEEKS GESTATION OF PREGNANCY: ICD-10-CM

## 2024-09-25 DIAGNOSIS — O32.9XX0 MALPRESENTATION BEFORE ONSET OF LABOR, SINGLE OR UNSPECIFIED FETUS: ICD-10-CM

## 2024-09-25 PROCEDURE — 0502F SUBSEQUENT PRENATAL CARE: CPT | Performed by: OBSTETRICS & GYNECOLOGY

## 2024-09-27 RX ORDER — ONDANSETRON 4 MG/1
8 TABLET, ORALLY DISINTEGRATING ORAL ONCE
Status: CANCELLED | OUTPATIENT
Start: 2024-09-27 | End: 2024-09-27

## 2024-09-27 RX ORDER — METHYLERGONOVINE MALEATE 0.2 MG/ML
0.2 INJECTION INTRAVENOUS ONCE AS NEEDED
Status: CANCELLED | OUTPATIENT
Start: 2024-09-27

## 2024-09-27 RX ORDER — OXYTOCIN/0.9 % SODIUM CHLORIDE 30/500 ML
667 PLASTIC BAG, INJECTION (ML) INTRAVENOUS ONCE
Status: CANCELLED | OUTPATIENT
Start: 2024-09-27 | End: 2024-09-27

## 2024-09-27 RX ORDER — OXYTOCIN/0.9 % SODIUM CHLORIDE 30/500 ML
83 PLASTIC BAG, INJECTION (ML) INTRAVENOUS CONTINUOUS
Status: CANCELLED | OUTPATIENT
Start: 2024-09-27 | End: 2024-09-27

## 2024-09-27 RX ORDER — ACETAMINOPHEN 650 MG/1
650 SUPPOSITORY RECTAL ONCE
Status: CANCELLED | OUTPATIENT
Start: 2024-09-27 | End: 2024-09-28

## 2024-09-27 RX ORDER — CARBOPROST TROMETHAMINE 250 UG/ML
250 INJECTION, SOLUTION INTRAMUSCULAR ONCE AS NEEDED
Status: CANCELLED | OUTPATIENT
Start: 2024-09-27

## 2024-09-27 RX ORDER — MISOPROSTOL 100 UG/1
1000 TABLET ORAL ONCE AS NEEDED
Status: CANCELLED | OUTPATIENT
Start: 2024-09-27

## 2024-09-27 RX ORDER — ACETAMINOPHEN 325 MG/1
975 TABLET ORAL ONCE
Status: CANCELLED | OUTPATIENT
Start: 2024-09-27 | End: 2024-09-27

## 2024-09-27 RX ORDER — SODIUM CHLORIDE, SODIUM LACTATE, POTASSIUM CHLORIDE, CALCIUM CHLORIDE 600; 310; 30; 20 MG/100ML; MG/100ML; MG/100ML; MG/100ML
150 INJECTION, SOLUTION INTRAVENOUS CONTINUOUS
Status: CANCELLED | OUTPATIENT
Start: 2024-09-27 | End: 2024-10-04

## 2024-09-27 RX ORDER — SODIUM CITRATE AND CITRIC ACID MONOHYDRATE 334; 500 MG/5ML; MG/5ML
30 SOLUTION ORAL ONCE
Status: CANCELLED | OUTPATIENT
Start: 2024-09-27 | End: 2024-09-27

## 2024-09-27 RX ORDER — SODIUM CHLORIDE, SODIUM LACTATE, POTASSIUM CHLORIDE, CALCIUM CHLORIDE 600; 310; 30; 20 MG/100ML; MG/100ML; MG/100ML; MG/100ML
80 INJECTION, SOLUTION INTRAVENOUS CONTINUOUS
Status: CANCELLED | OUTPATIENT
Start: 2024-09-27 | End: 2024-09-28

## 2024-09-27 RX ORDER — OXYTOCIN 10 [USP'U]/ML
10 INJECTION, SOLUTION INTRAMUSCULAR; INTRAVENOUS ONCE AS NEEDED
Status: CANCELLED | OUTPATIENT
Start: 2024-09-27

## 2024-09-27 RX ORDER — ACETAMINOPHEN 160 MG/5ML
1000 LIQUID ORAL ONCE
Status: CANCELLED | OUTPATIENT
Start: 2024-09-27 | End: 2024-09-28

## 2024-09-27 NOTE — PRENATAL NOTE
Patient feels well.  Continues to feel regular fetal movement.  Her baby on bedside ultrasound is still in breech position.  After considering risk of primary  versus attempting a version the patient is wanting to proceed with a  section.  We will schedule this for her in the 39th-week.  GBS was collected today.  I did discuss with her that should she go into labor prior she would still be for .  Position can absolutely be confirmed at the time of admission.  She is happy with the plan.

## 2024-09-30 PROBLEM — Z3A.39 39 WEEKS GESTATION OF PREGNANCY: Status: ACTIVE | Noted: 2024-09-25

## 2024-09-30 PROBLEM — O32.9XX0 MALPRESENTATION BEFORE ONSET OF LABOR: Status: ACTIVE | Noted: 2024-09-25

## 2024-09-30 LAB
CLINDAMYCIN ISLT KB: ABNORMAL
GP B STREP DNA SPEC QL NAA+PROBE: POSITIVE
LAB CORP ORGANISMIDENTIFICATION: ABNORMAL

## 2024-10-02 ENCOUNTER — ROUTINE PRENATAL (OUTPATIENT)
Dept: OBSTETRICS AND GYNECOLOGY | Facility: CLINIC | Age: 34
End: 2024-10-02
Payer: COMMERCIAL

## 2024-10-02 VITALS
DIASTOLIC BLOOD PRESSURE: 74 MMHG | HEIGHT: 65 IN | WEIGHT: 188 LBS | BODY MASS INDEX: 31.32 KG/M2 | SYSTOLIC BLOOD PRESSURE: 120 MMHG

## 2024-10-02 DIAGNOSIS — Z34.80 SUPERVISION OF OTHER NORMAL PREGNANCY, ANTEPARTUM: ICD-10-CM

## 2024-10-02 DIAGNOSIS — Z36.9 ANTENATAL SCREENING ENCOUNTER: Primary | ICD-10-CM

## 2024-10-02 PROCEDURE — 0502F SUBSEQUENT PRENATAL CARE: CPT | Performed by: OBSTETRICS & GYNECOLOGY

## 2024-10-09 ENCOUNTER — ROUTINE PRENATAL (OUTPATIENT)
Dept: OBSTETRICS AND GYNECOLOGY | Facility: CLINIC | Age: 34
End: 2024-10-09
Payer: COMMERCIAL

## 2024-10-09 VITALS — SYSTOLIC BLOOD PRESSURE: 132 MMHG | WEIGHT: 188 LBS | BODY MASS INDEX: 31.28 KG/M2 | DIASTOLIC BLOOD PRESSURE: 78 MMHG

## 2024-10-09 DIAGNOSIS — Z34.00 PRIMIGRAVIDA, ANTEPARTUM: Primary | ICD-10-CM

## 2024-10-09 DIAGNOSIS — C43.71 MALIGNANT MELANOMA OF RIGHT LOWER EXTREMITY INCLUDING HIP (CMS/HCC): ICD-10-CM

## 2024-10-09 PROBLEM — O32.9XX0 MALPRESENTATION BEFORE ONSET OF LABOR: Status: RESOLVED | Noted: 2024-09-25 | Resolved: 2024-10-09

## 2024-10-09 PROBLEM — Z3A.39 39 WEEKS GESTATION OF PREGNANCY: Status: RESOLVED | Noted: 2024-09-25 | Resolved: 2024-10-09

## 2024-10-09 PROCEDURE — 0502F SUBSEQUENT PRENATAL CARE: CPT | Performed by: OBSTETRICS & GYNECOLOGY

## 2024-10-09 NOTE — PROGRESS NOTES
ROUTINE OB VISIT     HPI     Kelli Hernandez is a 34 y.o. female being seen today for her obstetrical visit. She is at 38w1d gestation. Patient reports no complaints. Fetal movement: normalno LOF or VB  .                    Review of Systems     ROS performed and negative unless noted in HPI          Objective            Visit Vitals  /78 (BP Location: Right upper arm, Patient Position: Sitting)   Wt 85.3 kg (188 lb)   LMP 01/16/2024   BMI 31.28 kg/m²        FHT:     150 BPM     Still breech - head in RUQ               Assessment/ Plan     Problem List Items Addressed This Visit          Musculoskeletal    Malignant melanoma of right lower extremity including hip (CMS/HCC)    Overview        Early stage, treated with excision only. Removed 2021. Treated by Dr. Boyd at [facility].    []send placenta at time of delivery            Obstetric    Primigravida, antepartum - Primary    Overview     [x]PNL  []CF/ SMA  [x]NIPT  [x]NT US  [-]AFP: declined  [x]Anatomy US  [x]28 wk labs  [x]TDAP  []Flu Shot  []GBS              Other    Breech presentation    Overview     9/19: BSUS showed breech position.  Repeat scan at 36wks and then can determine if she wants version vs CS    Set up for 1CS with Krishna 10/17          Pt wanted cervix checked - was closed and high  Reviewed labor precautions / reasons to call  RTO next week for CS.                   Dorothy Perez DO

## 2024-10-14 ENCOUNTER — TELEPHONE (OUTPATIENT)
Dept: OBSTETRICS AND GYNECOLOGY | Facility: CLINIC | Age: 34
End: 2024-10-14

## 2024-10-14 DIAGNOSIS — Z01.818 PREOP EXAMINATION: ICD-10-CM

## 2024-10-14 DIAGNOSIS — Z34.00 PRIMIGRAVIDA, ANTEPARTUM: Primary | ICD-10-CM

## 2024-10-14 DIAGNOSIS — Z36.9 ANTENATAL SCREENING ENCOUNTER: ICD-10-CM

## 2024-10-14 NOTE — TELEPHONE ENCOUNTER
Attempted to call patient.  No answer. LVM for patient to come to [facility] tomorrow AM and then to  office in afternoon for appt as there are no openings at  for appt. Will send [employer] message as well.

## 2024-10-14 NOTE — TELEPHONE ENCOUNTER
Patient is scheduled tomorrow for an ob appt, but she was told to also get labs drawn at [facility] tomorrow as well. She works in [place] so she wont be able to get labs at [place] and than have an appt at [place]. She wants to know if the appt is necessary. Please give her a call to discuss.

## 2024-10-16 ENCOUNTER — ANESTHESIA EVENT (INPATIENT)
Dept: OBSTETRICS AND GYNECOLOGY | Facility: HOSPITAL | Age: 34
End: 2024-10-16
Payer: COMMERCIAL

## 2024-10-16 ENCOUNTER — APPOINTMENT (OUTPATIENT)
Dept: LAB | Facility: HOSPITAL | Age: 34
End: 2024-10-16
Attending: OBSTETRICS & GYNECOLOGY
Payer: COMMERCIAL

## 2024-10-16 ENCOUNTER — ROUTINE PRENATAL (OUTPATIENT)
Dept: OBSTETRICS AND GYNECOLOGY | Facility: CLINIC | Age: 34
End: 2024-10-16
Payer: COMMERCIAL

## 2024-10-16 VITALS
DIASTOLIC BLOOD PRESSURE: 68 MMHG | HEIGHT: 65 IN | BODY MASS INDEX: 31.29 KG/M2 | WEIGHT: 187.8 LBS | SYSTOLIC BLOOD PRESSURE: 130 MMHG

## 2024-10-16 DIAGNOSIS — Z36.9 ANTENATAL SCREENING ENCOUNTER: ICD-10-CM

## 2024-10-16 DIAGNOSIS — Z34.00 PRIMIGRAVIDA, ANTEPARTUM: ICD-10-CM

## 2024-10-16 DIAGNOSIS — Z01.818 PREOP EXAMINATION: ICD-10-CM

## 2024-10-16 DIAGNOSIS — Z34.93 NORMAL PREGNANCY, THIRD TRIMESTER: Primary | ICD-10-CM

## 2024-10-16 LAB
ABO + RH BLD: NORMAL
BLD GP AB SCN SERPL QL: NEGATIVE
BLOOD BANK CMNT PATIENT-IMP: NORMAL
D AG BLD QL: POSITIVE
ERYTHROCYTE [DISTWIDTH] IN BLOOD BY AUTOMATED COUNT: 13.7 % (ref 11.7–14.4)
HBV SURFACE AG SER QL: NONREACTIVE
HCT VFR BLD AUTO: 40.2 % (ref 35–45)
HGB BLD-MCNC: 13.1 G/DL (ref 11.8–15.7)
LABORATORY COMMENT REPORT: NORMAL
MCH RBC QN AUTO: 30.5 PG (ref 28–33.2)
MCHC RBC AUTO-ENTMCNC: 32.6 G/DL (ref 32.2–35.5)
MCV RBC AUTO: 93.5 FL (ref 83–98)
PLATELET # BLD AUTO: 250 K/UL (ref 150–369)
PMV BLD AUTO: 10.1 FL (ref 9.4–12.3)
RBC # BLD AUTO: 4.3 M/UL (ref 3.93–5.22)
SPECIMEN EXP DATE BLD: NORMAL
WBC # BLD AUTO: 15.52 K/UL (ref 3.8–10.5)

## 2024-10-16 PROCEDURE — 87340 HEPATITIS B SURFACE AG IA: CPT

## 2024-10-16 PROCEDURE — 36415 COLL VENOUS BLD VENIPUNCTURE: CPT

## 2024-10-16 PROCEDURE — 0502F SUBSEQUENT PRENATAL CARE: CPT | Performed by: OBSTETRICS & GYNECOLOGY

## 2024-10-16 PROCEDURE — 86901 BLOOD TYPING SEROLOGIC RH(D): CPT

## 2024-10-16 PROCEDURE — 86592 SYPHILIS TEST NON-TREP QUAL: CPT

## 2024-10-16 PROCEDURE — 85027 COMPLETE CBC AUTOMATED: CPT

## 2024-10-16 PROCEDURE — 86900 BLOOD TYPING SEROLOGIC ABO: CPT

## 2024-10-16 PROCEDURE — 86850 RBC ANTIBODY SCREEN: CPT

## 2024-10-16 NOTE — ANESTHESIA PREPROCEDURE EVALUATION
Anesthesia ROS/MED HX    Anesthesia History - neg  Pulmonary    asthma  Neuro/Psych    Anxiety  Cardiovascular- neg  Hematological - neg  GI/Hepatic- neg  Musculoskeletal- neg  Renal Disease- neg  Endo/Other- neg      Relevant Problems   NEUROLOGY   (+) Anxiety      RESPIRATORY SYSTEM   (+) Asthma, exercise induced       Physical Exam    Airway   Mallampati: II   TM distance: <3 FB   Neck ROM: full  Cardiovascular - normal   Rhythm: regular   Rate: normalPulmonary - normal   clear to auscultation  Other Findings   Back - neg    landmarks identified    Dental - normal      Patient Active Problem List   Diagnosis    Primigravida, antepartum    Anxiety    Asthma, exercise induced    Malignant melanoma of right lower extremity including hip (CMS/HCC)    Breech presentation        Past Medical History:   Diagnosis Date    Asthma     Skin cancer        Past Surgical History   Procedure Laterality Date    Skin surgery         No current facility-administered medications for this encounter.       Prior to Admission medications    Medication Sig Start Date End Date Taking? Authorizing Provider   albuterol HFA 90 mcg/actuation inhaler Inhale 2 puffs every 6 hours as needed. 2/23/24   ProviderManny MD   cetirizine (ZyrTEC) 10 mg tablet Take 10 mg by mouth daily.    ProviderManny MD   cholecalciferol, vitamin D3, (VITAMIN D3 ORAL) Take by mouth.    ProviderManny MD   MAGNESIUM ORAL Take by mouth.    ProviderManny MD   omega-3 fatty acids-fish oil 300-1,000 mg capsule Take 1,000 mg by mouth daily.    ProviderManny MD   PRENATAL 189-GOQM-RPGWD AC-DHA ORAL Take by mouth.    ProviderManny MD       CBC Results         10/16/24 07/19/24 07/10/24     1353 1020 1413    WBC 15.52 14.8 17.55    RBC 4.30 3.90 3.85    HGB 13.1 12.1 11.9    HCT 40.2 35.3 35.5    MCV 93.5 91 92.2    MCH 30.5 31.0 30.9    MCHC 32.6 34.3 33.5     297 329             BMP  Results         07/10/24     1413        K 3.7    Cl 102    CO2 24    Glucose 114    BUN 10    Creatinine 0.5    Calcium 9.1    Anion Gap 9    EGFR >60.0           Comment for K at 1413 on 07/10/24: Results obtained on plasma. Plasma Potassium values may be up to 0.4 mEQ/L less than serum values. The differences may be greater for patients with high platelet or white cell counts.    Comment for EGFR at 1413 on 07/10/24: Calculation based on the Chronic Kidney Disease Epidemiology Collaboration (CKD-EPI) equation refit without adjustment for race.                 Anesthesia Plan    Plan: spinal    Technique: spinal     Lines and Monitors: PIV     Airway: natural airway / supplemental oxygen    2 ASA  Blood Products:      Consented to blood products  Anesthetic plan and risks discussed with: patient  Postop Plan:   Patient Disposition: inpatient floor planned admission   Pain Management: spinal and IV analgesics

## 2024-10-16 NOTE — PRENATAL NOTE
Pt doing well.  +FM.  No vb/ctxns.  US by me today confirms breech position.  FHT 132bpm.   CS tomorrow AM.

## 2024-10-17 ENCOUNTER — HOSPITAL ENCOUNTER (INPATIENT)
Facility: HOSPITAL | Age: 34
LOS: 3 days | Discharge: HOME | End: 2024-10-20
Attending: OBSTETRICS & GYNECOLOGY | Admitting: OBSTETRICS & GYNECOLOGY
Payer: COMMERCIAL

## 2024-10-17 ENCOUNTER — ANESTHESIA (INPATIENT)
Dept: OBSTETRICS AND GYNECOLOGY | Facility: HOSPITAL | Age: 34
End: 2024-10-17
Payer: COMMERCIAL

## 2024-10-17 DIAGNOSIS — J45.990 ASTHMA, EXERCISE INDUCED: ICD-10-CM

## 2024-10-17 DIAGNOSIS — F41.9 ANXIETY: ICD-10-CM

## 2024-10-17 DIAGNOSIS — C43.71 MALIGNANT MELANOMA OF RIGHT LOWER EXTREMITY INCLUDING HIP (CMS/HCC): Primary | ICD-10-CM

## 2024-10-17 DIAGNOSIS — O32.9XX0 MALPRESENTATION BEFORE ONSET OF LABOR, SINGLE OR UNSPECIFIED FETUS: ICD-10-CM

## 2024-10-17 DIAGNOSIS — Z3A.39 39 WEEKS GESTATION OF PREGNANCY: ICD-10-CM

## 2024-10-17 LAB — RPR SER QL: NORMAL

## 2024-10-17 PROCEDURE — 71000011 HC PACU PHASE 1 EA ADDL MIN: Performed by: OBSTETRICS & GYNECOLOGY

## 2024-10-17 PROCEDURE — 63600000 HC DRUGS/DETAIL CODE: Performed by: ANESTHESIOLOGY

## 2024-10-17 PROCEDURE — 12000000 HC ROOM AND CARE MED/SURG

## 2024-10-17 PROCEDURE — 63700000 HC SELF-ADMINISTRABLE DRUG: Performed by: OBSTETRICS & GYNECOLOGY

## 2024-10-17 PROCEDURE — 63600000 HC DRUGS/DETAIL CODE: Mod: JZ | Performed by: OBSTETRICS & GYNECOLOGY

## 2024-10-17 PROCEDURE — 25800000 HC PHARMACY IV SOLUTIONS: Performed by: OBSTETRICS & GYNECOLOGY

## 2024-10-17 PROCEDURE — 59510 CESAREAN DELIVERY: CPT | Performed by: OBSTETRICS & GYNECOLOGY

## 2024-10-17 PROCEDURE — 25000000 HC PHARMACY GENERAL: Performed by: ANESTHESIOLOGY

## 2024-10-17 PROCEDURE — 0UB70ZZ EXCISION OF BILATERAL FALLOPIAN TUBES, OPEN APPROACH: ICD-10-PCS | Performed by: OBSTETRICS & GYNECOLOGY

## 2024-10-17 PROCEDURE — 71000001 HC PACU PHASE 1 INITIAL 30MIN: Performed by: OBSTETRICS & GYNECOLOGY

## 2024-10-17 PROCEDURE — 88304 TISSUE EXAM BY PATHOLOGIST: CPT | Performed by: OBSTETRICS & GYNECOLOGY

## 2024-10-17 PROCEDURE — 72000021 HC C SECTION LEVEL 1: Performed by: OBSTETRICS & GYNECOLOGY

## 2024-10-17 PROCEDURE — 25000000 HC PHARMACY GENERAL: Performed by: OBSTETRICS & GYNECOLOGY

## 2024-10-17 PROCEDURE — 27200121 HC CATH FOLEY

## 2024-10-17 PROCEDURE — 37000010 HC ANESTHESIA SPINAL: Performed by: OBSTETRICS & GYNECOLOGY

## 2024-10-17 RX ORDER — HYDROMORPHONE HYDROCHLORIDE 1 MG/ML
0.5 INJECTION, SOLUTION INTRAMUSCULAR; INTRAVENOUS; SUBCUTANEOUS
Status: DISCONTINUED | OUTPATIENT
Start: 2024-10-17 | End: 2024-10-20 | Stop reason: HOSPADM

## 2024-10-17 RX ORDER — OXYTOCIN 10 [USP'U]/ML
10 INJECTION, SOLUTION INTRAMUSCULAR; INTRAVENOUS ONCE
Status: COMPLETED | OUTPATIENT
Start: 2024-10-17 | End: 2024-10-17

## 2024-10-17 RX ORDER — MISOPROSTOL 200 UG/1
1000 TABLET ORAL ONCE AS NEEDED
Status: DISCONTINUED | OUTPATIENT
Start: 2024-10-17 | End: 2024-10-17

## 2024-10-17 RX ORDER — METHYLERGONOVINE MALEATE 0.2 MG/ML
0.2 INJECTION INTRAVENOUS ONCE AS NEEDED
Status: DISCONTINUED | OUTPATIENT
Start: 2024-10-17 | End: 2024-10-17

## 2024-10-17 RX ORDER — CLINDAMYCIN PHOSPHATE 900 MG/50ML
900 INJECTION, SOLUTION INTRAVENOUS
Status: COMPLETED | OUTPATIENT
Start: 2024-10-17 | End: 2024-10-17

## 2024-10-17 RX ORDER — CARBOPROST TROMETHAMINE 250 UG/ML
250 INJECTION, SOLUTION INTRAMUSCULAR ONCE AS NEEDED
Status: DISCONTINUED | OUTPATIENT
Start: 2024-10-17 | End: 2024-10-17

## 2024-10-17 RX ORDER — FENTANYL CITRATE 50 UG/ML
50 INJECTION, SOLUTION INTRAMUSCULAR; INTRAVENOUS EVERY 5 MIN PRN
Status: DISCONTINUED | OUTPATIENT
Start: 2024-10-17 | End: 2024-10-20 | Stop reason: HOSPADM

## 2024-10-17 RX ORDER — SODIUM CHLORIDE, SODIUM LACTATE, POTASSIUM CHLORIDE, CALCIUM CHLORIDE 600; 310; 30; 20 MG/100ML; MG/100ML; MG/100ML; MG/100ML
150 INJECTION, SOLUTION INTRAVENOUS CONTINUOUS
Status: DISCONTINUED | OUTPATIENT
Start: 2024-10-17 | End: 2024-10-17

## 2024-10-17 RX ORDER — ONDANSETRON HYDROCHLORIDE 2 MG/ML
4 INJECTION, SOLUTION INTRAVENOUS
Status: DISCONTINUED | OUTPATIENT
Start: 2024-10-17 | End: 2024-10-20 | Stop reason: HOSPADM

## 2024-10-17 RX ORDER — ONDANSETRON 8 MG/1
8 TABLET, ORALLY DISINTEGRATING ORAL ONCE
Status: COMPLETED | OUTPATIENT
Start: 2024-10-17 | End: 2024-10-17

## 2024-10-17 RX ORDER — DIPHENHYDRAMINE HCL 50 MG/ML
25 VIAL (ML) INJECTION EVERY 6 HOURS PRN
Status: DISCONTINUED | OUTPATIENT
Start: 2024-10-17 | End: 2024-10-20 | Stop reason: HOSPADM

## 2024-10-17 RX ORDER — AMOXICILLIN 250 MG
1 CAPSULE ORAL 2 TIMES DAILY
Status: DISCONTINUED | OUTPATIENT
Start: 2024-10-17 | End: 2024-10-20 | Stop reason: HOSPADM

## 2024-10-17 RX ORDER — ACETAMINOPHEN 325 MG/1
975 TABLET ORAL ONCE
Status: COMPLETED | OUTPATIENT
Start: 2024-10-17 | End: 2024-10-17

## 2024-10-17 RX ORDER — SODIUM CHLORIDE, SODIUM LACTATE, POTASSIUM CHLORIDE, CALCIUM CHLORIDE 600; 310; 30; 20 MG/100ML; MG/100ML; MG/100ML; MG/100ML
80 INJECTION, SOLUTION INTRAVENOUS CONTINUOUS
Status: DISCONTINUED | OUTPATIENT
Start: 2024-10-17 | End: 2024-10-17

## 2024-10-17 RX ORDER — ACETAMINOPHEN 650 MG/1
650 SUPPOSITORY RECTAL ONCE
Status: COMPLETED | OUTPATIENT
Start: 2024-10-17 | End: 2024-10-17

## 2024-10-17 RX ORDER — MORPHINE SULFATE 0.5 MG/ML
INJECTION, SOLUTION EPIDURAL; INTRATHECAL; INTRAVENOUS AS NEEDED
Status: DISCONTINUED | OUTPATIENT
Start: 2024-10-17 | End: 2024-10-17 | Stop reason: SURG

## 2024-10-17 RX ORDER — EPHEDRINE SULFATE 50 MG/ML
INJECTION, SOLUTION INTRAVENOUS AS NEEDED
Status: DISCONTINUED | OUTPATIENT
Start: 2024-10-17 | End: 2024-10-17 | Stop reason: SURG

## 2024-10-17 RX ORDER — NALBUPHINE HYDROCHLORIDE 10 MG/ML
2.5 INJECTION INTRAMUSCULAR; INTRAVENOUS; SUBCUTANEOUS ONCE AS NEEDED
Status: DISCONTINUED | OUTPATIENT
Start: 2024-10-17 | End: 2024-10-17 | Stop reason: HOSPADM

## 2024-10-17 RX ORDER — BISACODYL 10 MG/1
10 SUPPOSITORY RECTAL DAILY PRN
Status: DISCONTINUED | OUTPATIENT
Start: 2024-10-17 | End: 2024-10-20 | Stop reason: HOSPADM

## 2024-10-17 RX ORDER — KETOROLAC TROMETHAMINE 30 MG/ML
30 INJECTION, SOLUTION INTRAMUSCULAR; INTRAVENOUS EVERY 6 HOURS PRN
Status: DISCONTINUED | OUTPATIENT
Start: 2024-10-17 | End: 2024-10-17 | Stop reason: SDUPTHER

## 2024-10-17 RX ORDER — DIPHENHYDRAMINE HCL 25 MG
25 CAPSULE ORAL EVERY 6 HOURS PRN
Status: DISCONTINUED | OUTPATIENT
Start: 2024-10-17 | End: 2024-10-20 | Stop reason: HOSPADM

## 2024-10-17 RX ORDER — OXYTOCIN/0.9 % SODIUM CHLORIDE 30/500 ML
83 PLASTIC BAG, INJECTION (ML) INTRAVENOUS CONTINUOUS
Status: DISCONTINUED | OUTPATIENT
Start: 2024-10-17 | End: 2024-10-17

## 2024-10-17 RX ORDER — BUPIVACAINE HYDROCHLORIDE 7.5 MG/ML
INJECTION, SOLUTION INTRASPINAL AS NEEDED
Status: DISCONTINUED | OUTPATIENT
Start: 2024-10-17 | End: 2024-10-17 | Stop reason: SURG

## 2024-10-17 RX ORDER — OXYTOCIN 10 [USP'U]/ML
10 INJECTION, SOLUTION INTRAMUSCULAR; INTRAVENOUS ONCE AS NEEDED
Status: DISCONTINUED | OUTPATIENT
Start: 2024-10-17 | End: 2024-10-20 | Stop reason: HOSPADM

## 2024-10-17 RX ORDER — ACETAMINOPHEN 650 MG/20.3ML
1000 LIQUID ORAL ONCE
Status: COMPLETED | OUTPATIENT
Start: 2024-10-17 | End: 2024-10-17

## 2024-10-17 RX ORDER — TRANEXAMIC ACID 10 MG/ML
1000 INJECTION, SOLUTION INTRAVENOUS ONCE AS NEEDED
Status: DISCONTINUED | OUTPATIENT
Start: 2024-10-17 | End: 2024-10-17

## 2024-10-17 RX ORDER — DIBUCAINE 1 %
1 OINTMENT (GRAM) TOPICAL AS NEEDED
Status: DISCONTINUED | OUTPATIENT
Start: 2024-10-17 | End: 2024-10-20 | Stop reason: HOSPADM

## 2024-10-17 RX ORDER — IBUPROFEN 600 MG/1
600 TABLET ORAL
Status: DISCONTINUED | OUTPATIENT
Start: 2024-10-17 | End: 2024-10-17

## 2024-10-17 RX ORDER — ONDANSETRON HYDROCHLORIDE 2 MG/ML
4 INJECTION, SOLUTION INTRAVENOUS EVERY 8 HOURS PRN
Status: DISCONTINUED | OUTPATIENT
Start: 2024-10-17 | End: 2024-10-20 | Stop reason: HOSPADM

## 2024-10-17 RX ORDER — CALCIUM CARBONATE 200(500)MG
200 TABLET,CHEWABLE ORAL EVERY 4 HOURS PRN
Status: DISCONTINUED | OUTPATIENT
Start: 2024-10-17 | End: 2024-10-20 | Stop reason: HOSPADM

## 2024-10-17 RX ORDER — PHENYLEPHRINE HYDROCHLORIDE 10 MG/ML
INJECTION INTRAVENOUS AS NEEDED
Status: DISCONTINUED | OUTPATIENT
Start: 2024-10-17 | End: 2024-10-17 | Stop reason: SURG

## 2024-10-17 RX ORDER — IBUPROFEN 600 MG/1
600 TABLET ORAL
Status: DISPENSED | OUTPATIENT
Start: 2024-10-17 | End: 2024-10-19

## 2024-10-17 RX ORDER — ACETAMINOPHEN 325 MG/1
975 TABLET ORAL
Status: DISCONTINUED | OUTPATIENT
Start: 2024-10-17 | End: 2024-10-20 | Stop reason: HOSPADM

## 2024-10-17 RX ORDER — MORPHINE SULFATE 0.5 MG/ML
INJECTION, SOLUTION EPIDURAL; INTRATHECAL; INTRAVENOUS
Status: COMPLETED
Start: 2024-10-17 | End: 2024-10-17

## 2024-10-17 RX ORDER — FERROUS SULFATE 325(65) MG
325 TABLET ORAL DAILY PRN
Status: DISCONTINUED | OUTPATIENT
Start: 2024-10-17 | End: 2024-10-20 | Stop reason: HOSPADM

## 2024-10-17 RX ORDER — IBUPROFEN 600 MG/1
600 TABLET ORAL EVERY 6 HOURS PRN
Status: DISCONTINUED | OUTPATIENT
Start: 2024-10-19 | End: 2024-10-20 | Stop reason: HOSPADM

## 2024-10-17 RX ORDER — METOCLOPRAMIDE HYDROCHLORIDE 5 MG/ML
10 INJECTION INTRAMUSCULAR; INTRAVENOUS EVERY 6 HOURS PRN
Status: DISCONTINUED | OUTPATIENT
Start: 2024-10-17 | End: 2024-10-20 | Stop reason: HOSPADM

## 2024-10-17 RX ORDER — ONDANSETRON HYDROCHLORIDE 2 MG/ML
4 INJECTION, SOLUTION INTRAVENOUS ONCE
Status: DISPENSED | OUTPATIENT
Start: 2024-10-17 | End: 2024-10-18

## 2024-10-17 RX ORDER — ONDANSETRON 4 MG/1
4 TABLET, ORALLY DISINTEGRATING ORAL EVERY 8 HOURS PRN
Status: DISCONTINUED | OUTPATIENT
Start: 2024-10-17 | End: 2024-10-20 | Stop reason: HOSPADM

## 2024-10-17 RX ORDER — CETIRIZINE HYDROCHLORIDE 10 MG/1
10 TABLET ORAL DAILY
Status: DISCONTINUED | OUTPATIENT
Start: 2024-10-17 | End: 2024-10-20 | Stop reason: HOSPADM

## 2024-10-17 RX ORDER — ALBUTEROL SULFATE 90 UG/1
2 INHALANT RESPIRATORY (INHALATION) EVERY 4 HOURS PRN
Status: DISCONTINUED | OUTPATIENT
Start: 2024-10-17 | End: 2024-10-20 | Stop reason: HOSPADM

## 2024-10-17 RX ORDER — ALUMINUM HYDROXIDE, MAGNESIUM HYDROXIDE, AND SIMETHICONE 1200; 120; 1200 MG/30ML; MG/30ML; MG/30ML
30 SUSPENSION ORAL EVERY 4 HOURS PRN
Status: DISCONTINUED | OUTPATIENT
Start: 2024-10-17 | End: 2024-10-20 | Stop reason: HOSPADM

## 2024-10-17 RX ORDER — KETOROLAC TROMETHAMINE 30 MG/ML
30 INJECTION, SOLUTION INTRAMUSCULAR; INTRAVENOUS
Status: DISCONTINUED | OUTPATIENT
Start: 2024-10-17 | End: 2024-10-17

## 2024-10-17 RX ORDER — KETOROLAC TROMETHAMINE 30 MG/ML
30 INJECTION, SOLUTION INTRAMUSCULAR; INTRAVENOUS
Status: DISPENSED | OUTPATIENT
Start: 2024-10-17 | End: 2024-10-19

## 2024-10-17 RX ORDER — OXYCODONE HYDROCHLORIDE 5 MG/1
5 TABLET ORAL EVERY 4 HOURS PRN
Status: DISCONTINUED | OUTPATIENT
Start: 2024-10-17 | End: 2024-10-20 | Stop reason: HOSPADM

## 2024-10-17 RX ORDER — OXYTOCIN/0.9 % SODIUM CHLORIDE 30/500 ML
667 PLASTIC BAG, INJECTION (ML) INTRAVENOUS ONCE
Status: COMPLETED | OUTPATIENT
Start: 2024-10-17 | End: 2024-10-17

## 2024-10-17 RX ORDER — ONDANSETRON HYDROCHLORIDE 2 MG/ML
4 INJECTION, SOLUTION INTRAVENOUS EVERY 6 HOURS PRN
Status: DISCONTINUED | OUTPATIENT
Start: 2024-10-17 | End: 2024-10-17 | Stop reason: HOSPADM

## 2024-10-17 RX ORDER — SODIUM CITRATE AND CITRIC ACID MONOHYDRATE 334; 500 MG/5ML; MG/5ML
30 SOLUTION ORAL ONCE
Status: COMPLETED | OUTPATIENT
Start: 2024-10-17 | End: 2024-10-17

## 2024-10-17 RX ORDER — PROCHLORPERAZINE EDISYLATE 5 MG/ML
10 INJECTION INTRAMUSCULAR; INTRAVENOUS EVERY 6 HOURS PRN
Status: DISCONTINUED | OUTPATIENT
Start: 2024-10-17 | End: 2024-10-17 | Stop reason: HOSPADM

## 2024-10-17 RX ADMIN — OXYCODONE HYDROCHLORIDE 5 MG: 5 TABLET ORAL at 19:03

## 2024-10-17 RX ADMIN — PHENYLEPHRINE HYDROCHLORIDE 100 MCG: 10 INJECTION INTRAVENOUS at 09:30

## 2024-10-17 RX ADMIN — DOCUSATE SODIUM AND SENNOSIDES 1 TABLET: 8.6; 5 TABLET, FILM COATED ORAL at 21:27

## 2024-10-17 RX ADMIN — ACETAMINOPHEN 975 MG: 325 TABLET ORAL at 15:38

## 2024-10-17 RX ADMIN — EPHEDRINE SULFATE 10 MG: 50 INJECTION, SOLUTION INTRAVENOUS at 09:26

## 2024-10-17 RX ADMIN — ACETAMINOPHEN 975 MG: 325 TABLET ORAL at 21:27

## 2024-10-17 RX ADMIN — PHENYLEPHRINE HYDROCHLORIDE 100 MCG: 10 INJECTION INTRAVENOUS at 09:49

## 2024-10-17 RX ADMIN — KETOROLAC TROMETHAMINE 30 MG: 30 INJECTION, SOLUTION INTRAMUSCULAR at 11:27

## 2024-10-17 RX ADMIN — MORPHINE SULFATE 0.15 MG: 0.5 INJECTION, SOLUTION EPIDURAL; INTRATHECAL; INTRAVENOUS at 09:16

## 2024-10-17 RX ADMIN — SODIUM CHLORIDE, POTASSIUM CHLORIDE, SODIUM LACTATE AND CALCIUM CHLORIDE 999 ML/HR: 600; 310; 30; 20 INJECTION, SOLUTION INTRAVENOUS at 08:51

## 2024-10-17 RX ADMIN — SODIUM CITRATE AND CITRIC ACID MONOHYDRATE 30 ML: 500; 334 SOLUTION ORAL at 08:50

## 2024-10-17 RX ADMIN — CETIRIZINE HYDROCHLORIDE 10 MG: 10 TABLET, FILM COATED ORAL at 15:39

## 2024-10-17 RX ADMIN — ACETAMINOPHEN 975 MG: 325 TABLET ORAL at 08:50

## 2024-10-17 RX ADMIN — PHENYLEPHRINE HYDROCHLORIDE 100 MCG: 10 INJECTION INTRAVENOUS at 09:25

## 2024-10-17 RX ADMIN — SODIUM CHLORIDE, POTASSIUM CHLORIDE, SODIUM LACTATE AND CALCIUM CHLORIDE 80 ML/HR: 600; 310; 30; 20 INJECTION, SOLUTION INTRAVENOUS at 14:12

## 2024-10-17 RX ADMIN — HYDROMORPHONE HYDROCHLORIDE 0.5 MG: 1 INJECTION, SOLUTION INTRAMUSCULAR; INTRAVENOUS; SUBCUTANEOUS at 13:31

## 2024-10-17 RX ADMIN — Medication 667 MILLI-UNITS/MIN: at 09:37

## 2024-10-17 RX ADMIN — OXYTOCIN 10 UNITS: 10 INJECTION, SOLUTION INTRAMUSCULAR; INTRAVENOUS at 11:47

## 2024-10-17 RX ADMIN — CLINDAMYCIN PHOSPHATE 900 MG: 900 INJECTION, SOLUTION INTRAVENOUS at 08:55

## 2024-10-17 RX ADMIN — ONDANSETRON 8 MG: 8 TABLET, ORALLY DISINTEGRATING ORAL at 08:50

## 2024-10-17 RX ADMIN — PHENYLEPHRINE HYDROCHLORIDE 100 MCG: 10 INJECTION INTRAVENOUS at 09:21

## 2024-10-17 RX ADMIN — BUPIVACAINE HYDROCHLORIDE IN DEXTROSE 1.6 ML: 7.5 INJECTION, SOLUTION SUBARACHNOID at 09:16

## 2024-10-17 RX ADMIN — KETOROLAC TROMETHAMINE 30 MG: 30 INJECTION, SOLUTION INTRAMUSCULAR at 17:35

## 2024-10-17 RX ADMIN — ONDANSETRON 4 MG: 2 INJECTION INTRAMUSCULAR; INTRAVENOUS at 13:10

## 2024-10-17 RX ADMIN — GENTAMICIN SULFATE 340 MG: 40 INJECTION, SOLUTION INTRAMUSCULAR; INTRAVENOUS at 08:56

## 2024-10-17 ASSESSMENT — PAIN SCALES - GENERAL: PAIN_LEVEL: 2

## 2024-10-17 NOTE — DISCHARGE INSTRUCTIONS
"After Your  Delivery Discharge Instructions    After Discharge Orders:  Please call the office at [phone] to make a follow up appointment in 1 and 6 weeks. First appointment is for your incision check and the next for your post partum visit.      Pain Medications: We would like you to continue your alternating doses of two Extra Strength Tylenol (500mg each tablet) and three Ibuprofen (200mg each tablet) when you are discharged home. Both of these medications can be purchased over the counter at most pharmacies and grocery stores. We suggest taking 600mg of Ibuprofen followed by 1000mg of Tylenol three hours later, and then continuing this pattern every three hours. As you become more comfortable you can extend the time duration between doses or stop one of the medications entirely. If you were prescribed narcotic medications we suggest using these only if the above regimen does not adequately control your pain. You can take 5-10mg of Oxycodone or 2-4mg of Dilaudid every 6 hours as needed. If this regimen does not control your pain, please call the office so we can re-evaluate your pain medications and get you more comfortable.      Call the Physician with any  signs and symptoms:    Warning signs regarding incision:  \"Popping\" of stitches or staples  Foul smelling discharge or pus  More redness or streaks around incision than before    Incision care:  Keep incision uncovered  No tub baths until OK'd by your Physician (6 weeks postpartum).  You may use warm or cold compresses on incision site     After your delivery - signs and symptoms to watch for:  Fever - Oral temperature greater than 100.4 degrees Fahrenheit  Foul-smelling vaginal discharge  Headache unrelieved by \"pain meds\"  Difficulty urinating  Breasts reddened, hard, hot to the touch  Nipple discharge which is foul-smelling or contains pus  Increased pain at the site of the surgical incision  Difficulty breathing with or without chest " pain  New calf pain especially if only on one side  Sudden, continuing increased vaginal bleeding with or without clots  Unrelieved feelings of:  Inability to cope  Sadness  Anxiety  Lack of interest in baby  Insomnia  Crying     What to do at home:  You should wait at least 3 weeks before resuming driving. You may resume driving when you feel able to control a vehicle. NO DRIVING while using narcotic (hydromorphone, dilaudid, oxycodone, Percocet, Tylenol #3, vicodin) pain medications.  See patient education handouts for full information  Resume activity gradually   Don't lift anything heavier than baby and carrier (6 weeks postpartum).  No sex until OK'd by your Physician (6 weeks postpartum).  Take care of yourself by sleeping/resting as much as possible  Eat regular nutritious meals  Let someone else care for you, your baby, and housework as much as possible   Take pain medication as prescribed whenever you need them  Wear compression stockings if prescribed   To avoid/relieve constipation take stool softeners if advised   Drink lots of water/fruit juices  Increase fiber in your diet  Breast care: Wear support bra ; use lanolin ointment/cream as needed     Refer to [facility] Discharge Instructions for problems or follow-up regarding  feeding

## 2024-10-17 NOTE — ANESTHESIA POSTPROCEDURE EVALUATION
Patient: Kelli Hernandez    Procedure Summary       Date: 10/17/24 Room / Location: [facility] LD OR  [facility] L&D OR    Anesthesia Start: 909 Anesthesia Stop:     Procedure:  SECTION (Pelvis) Diagnosis:       Malpresentation before onset of labor, single or unspecified fetus      39 weeks gestation of pregnancy      (Malpresentation before onset of labor, single or unspecified fetus [O32.9XX0])      (39 weeks gestation of pregnancy [Z3A.39])    Surgeons: Adan Keller MD Responsible Provider: Isatu Barreto DO    Anesthesia Type: spinal ASA Status: 2            Anesthesia Type: spinal  PACU Vitals  10/17/2024 0957 - 10/17/2024 1057        10/17/2024  1000 10/17/2024  1015 10/17/2024  1045 10/17/2024  1051    BP: 110/59 98/54 107/57 112/59    Temp: 36.3 °C (97.4 °F) -- -- --    Pulse: 81 69 91 74    Resp: 16 18 16 18              Anesthesia Post Evaluation    Pain score: 2  Pain management: adequate  Mode of pain management: IV medication  Patient location during evaluation: PACU  Patient participation: complete - patient participated  Level of consciousness: awake and alert  Cardiovascular status: acceptable  Airway Patency: adequate  Respiratory status: acceptable  Hydration status: acceptable  Anesthetic complications: no  Comments: Block receding

## 2024-10-17 NOTE — OP NOTE
OB  Delivery OP Note    Date of Procedure: 10/17/2024  Patient:Kelli Hernandez  :1990    Procedure:     SECTION  CPT(R) Code:  52583 - LA FULL ROUT OBSTE CARE, DELIV    Review the Delivery Report for details.      Details    Pre-Op Diagnosis: 1. 34 y.o.  Intrauterine pregnancy at 39w2d  2. Brech girl   Post-Op Diagnosis: 1. Same  cyst of morgagni b/l  cysts in mesosalpinx small    Indication: Breech   Procedure: primary , Low Transverse via low transverseuterine incision and Pfannenstielskin incision. Removal of cyst of morgagni b/l   Anesthesia: Spinal   Findings: Normal uterus, tubes, and ovaries.   EBL  300 mL   Complications: None     Specimen Information:  ID Type Source Tests Collected by Time Destination   1 : Right cyst found during c/s Tissue Paratubal Cyst, Right PATHOLOGY TISSUE EXAM Adan Keller MD 10/17/2024 0945    2 : L cyst found during c/s Tissue Paratubal Cyst, Left PATHOLOGY TISSUE EXAM Adan Keller MD 10/17/2024 0945      Drains: Resendez draining clear    Staff:  Surgeon(s):  Adan Keller MD  Anesthesiologist: Isatu Barreto DO   Circulator: Bruce Crook RN  Nurse Practitioner: Kaelyn Barber CRNP  Scrub Person: Casey Vizcarra; Glendy Campos, RN  Baby Nurse: Marianna Kruger, RN  Other Staff:  TERRY PRECIADO;BRUCE CROOK;MARIANNA KRUGER;KAELYN BARBER;CASEY VIZCARRA;GLENDY CAMPOS;ISATU BARRETO Delivery Assist;Delivery Nurse;Nursery Nurse;Nurse Practitioner;Technician;Scrub Nurse;Anesthesiologist    INFANT INFORMATION  Time of Birth:9:35 AM  Presentation: Breech  Position: , , ,   Cord: 3 vessels,Complications:None  [place] Sex: female   Weight: 3.669 kg (8 lb 1.4 oz)     1 Minute 5 Minute 10 Minute   Apgar Totals: 8   9          Information for the patient's :  Danny Hernandez [[id number]]      Cord Gas       None            Informed Consent:  An informed  consent was obtained.    Procedure Details:  The patient was taken to the operating room where Spinal anesthesia was placed and found to be adequate. Antibiotics were given for infection prophylaxis. The patient was prepped and draped in the normal sterile fashion.  A timeout was performed.  A Pfannenstiel skin incision was made with the scalpel. The incision was carried down to the fascia using the bovie. The fascia was incised and this was extended laterally with the bovie. The fascia was grasped with Kocher clamps and the underlying rectus muscle was dissected off.  The rectus muscles were  bluntly. The peritoneum was identified and entered bluntly. The peritoneum was dissected to allow for adequate visualization.    The bladder blade was inserted. The vesicouterine peritoneum was identified and a bladder flap created sharply The lower uterine segment was then incised with a low transverse incision and was extended bluntly. The amniotic sac was ruptured and Clear fluid noted. The bladder blade was removed and the infant's head was delivered atraumatically using the usual maneuvers. The remainder of the infant was delivered, a spontaneous cry was heard, and the infant appeared to be moving all 4 extremities. The cord clamped and cut, and the baby was handed off to the awaiting clinicians and neonatology staff.  The placenta was removed manually. The uterus was then exteriorized and cleared of all clots and debris. The hysterotomy was repaired with 0 Vicryl in a running locked fashion. good hemostasis observed. The ovaries and tubes appeared normal bilaterally. The uterus was then returned to the abdomen. The uterine incision was reinspected and found to be hemostatic. The gutters were inspected and cleared of all debris. The fascia was then reapproximated with a running suture of #1 Vicryl. The skin was closed with 3-0 Vicryl.    The patient tolerated the procedure well. The sponge, instrument, and needle  counts were correct and the patient was taken to recovery in stable condition.    Attending Attestation: I was present and scrubbed for the entire procedure. Natalee shelton assisted on all aspsects of this  procedure due to the complexity of the case and lack of any other assistant     Adan Keller MD

## 2024-10-17 NOTE — H&P
ROUTINE OB VISIT     HPI     Kelli Hernandez is a 34 y.o. female being seen today for her obstetrical visit. She is at 38w1d gestation. Patient reports no complaints. Fetal movement: normalno LOF or VB  breech for priamry cs at term   .                    Review of Systems     ROS performed and negative unless noted in HPI          Objective            Visit Vitals  /78 (BP Location: Right upper arm, Patient Position: Sitting)   Wt 85.3 kg (188 lb)   LMP 01/16/2024   BMI 31.28 kg/m²        FHT:     150 BPM     Still breech - head in RUQ               Assessment/ Plan     Problem List Items Addressed This Visit          Musculoskeletal    Malignant melanoma of right lower extremity including hip (CMS/HCC)    Overview        Early stage, treated with excision only. Removed 2021. Treated by Dr. Boyd at [facility].    []send placenta at time of delivery            Obstetric    Primigravida, antepartum - Primary    Overview     [x]PNL  []CF/ SMA  [x]NIPT  [x]NT US  [-]AFP: declined  [x]Anatomy US  [x]28 wk labs  [x]TDAP  []Flu Shot  []GBS              Other    Breech presentation    Overview     9/19: BSUS showed breech position.  Repeat scan at 36wks and then can determine if she wants version vs CS    Set up for 1CS with Krishna 10/17          Pt wanted cervix checked - was closed and high  Reviewed labor precautions / reasons to call  RTO next week for CS.                   Adan billings

## 2024-10-17 NOTE — ANESTHESIA PROCEDURE NOTES
Spinal Block    Patient location during procedure: OB  Start time: 10/17/2024 9:12 AM  End time: 10/17/2024 9:16 AM  Staffing  Performed: anesthesiologist   Anesthesiologist: Isatu Barreto DO  Performed by: Isatu Barreto DO  Authorized by: Isatu Barreto DO    Reason for block: primary anesthetic  Preanesthetic Checklist  Completed: patient identified, site marked, surgical consent, pre-op evaluation, timeout performed, IV checked, risks and benefits discussed, monitors and equipment checked and sterile field maintained during procedure  Spinal Block  Patient position: sitting  Prep: Betadine and site prepped and draped  Patient monitoring: heart rate, continuous pulse ox and blood pressure  Approach: midline  Location: L3-4  Injection technique: single-shot  Needle  Needle type: pencil-tip   Needle gauge: 24 G  Needle length: 4 in  Assessment  Sensory level: T4  Events: cerebrospinal fluid  Additional Notes  Uneventful spinal on first attempt.  No heme or paresthesia.  Clear CSF, easily aspirated.  Patient tolerated well.

## 2024-10-17 NOTE — DISCHARGE SUMMARY
Inpatient Discharge Summary    BRIEF OVERVIEW  Admitting Provider: Wendi Porter MD  Discharge Provider: Adan Keller MD  Primary Care Physician at Discharge: [place] Liudmila [phone]    Admission Date: 10/17/2024     Discharge Date: 10/20/2024    Primary Discharge Diagnosis  39 weeks gestation of pregnancy    Secondary Discharge Diagnosis  Breech cs for same removal of paratubal cysts     Discharge Disposition       Discharge Medications     Medication List        ASK your doctor about these medications      albuterol HFA 90 mcg/actuation inhaler  Inhale 2 puffs every 6 hours as needed.  Dose: 2 puff     cetirizine 10 mg tablet  Commonly known as: ZyrTEC  Take 10 mg by mouth daily.  Dose: 10 mg     MAGNESIUM ORAL  Take by mouth.     omega-3 fatty acids-fish oil 300-1,000 mg capsule  Take 1,000 mg by mouth daily.  Dose: 1,000 mg     PRENATAL 105-IRON-FOLIC AC-DHA ORAL  Take by mouth.     VITAMIN D3 ORAL  Take by mouth.              Active Issues Requiring Follow-up  Issue: na  Responsible Individual: na  What is Needed: na  Follow-up Appointments Arranged: Yes     Outpatient Follow-Up            In 1 week Jeanie Harris DO [facility] in [place]            Test Results Pending at Discharge  Unresulted Labs (From admission, onward)       Start     Ordered    10/17/24 0946  Pathology Tissue Exam  RELEASE UPON ORDERING        Comments: Specimen 1: Pre-op diagnosis:  Malpresentation before onset of labor, single or unspecified fetus [O32.9XX0]  39 weeks gestation of pregnancy [Z3A.39]    Specimen 2: Pre-op diagnosis:  Malpresentation before onset of labor, single or unspecified fetus [O32.9XX0]  39 weeks gestation of pregnancy [Z3A.39]     Question:  Release to patient  Answer:  Immediate    10/17/24 0946    10/17/24 0729  CBC  STAT        Question:  Release to patient  Answer:  Immediate    10/17/24 0728    10/17/24 0729  Hepatitis B surface antigen   Once        Question:  Release to patient  Answer:  Immediate    10/17/24 0728    10/17/24 0729  Syphilis Antibodies  STAT        Question:  Release to patient  Answer:  Immediate    10/17/24 0728    10/17/24 0729  Type and screen  Once        Question Answer Comment   Are you aware of this patient having previously identified antibodies? NO    Does this Patient have Sickle Cell Disease/Sickle Cell Anemia? NO    Release to patient Immediate        10/17/24 0728    Signed and Held  CBC (Complete Blood Count) Postpartum Day #1 in AM  Morning draw        Comments: Notify Provider for hemoglobin less than 8 grams.     Question Answer Comment   Release to patient Immediate    Release to patient Immediate        Signed and Held                    DETAILS OF HOSPITAL STAY    Presenting Problem/History of Present Illness  Malpresentation before onset of labor, single or unspecified fetus [O32.9XX0]  39 weeks gestation of pregnancy [Z3A.39]  Breech cs for same girl cyst of morgagni removed from both tubes     Hospital Course/Operative Procedures Performed  Procedure(s):   SECTION  Consults: none  Procedures: na  Pertinent Test Results:  na  na

## 2024-10-17 NOTE — ANESTHESIOLOGIST PRE-PROCEDURE ATTESTATION
Pre-Procedure Patient Identification:  I am the Primary Anesthesiologist and have identified the patient on 10/17/24 at 7:51 AM.   I have confirmed the procedure(s) will be performed by the following surgeon/proceduralist Adan Keller MD.

## 2024-10-18 LAB
CASE RPRT: NORMAL
CLINICAL INFO: NORMAL
ERYTHROCYTE [DISTWIDTH] IN BLOOD BY AUTOMATED COUNT: 13.5 % (ref 11.7–14.4)
HCT VFR BLD AUTO: 31.5 % (ref 35–45)
HGB BLD-MCNC: 10.5 G/DL (ref 11.8–15.7)
MCH RBC QN AUTO: 31.3 PG (ref 28–33.2)
MCHC RBC AUTO-ENTMCNC: 33.3 G/DL (ref 32.2–35.5)
MCV RBC AUTO: 93.8 FL (ref 83–98)
PATH REPORT.FINAL DX SPEC: NORMAL
PATH REPORT.FINAL DX SPEC: NORMAL
PATH REPORT.GROSS SPEC: NORMAL
PLATELET # BLD AUTO: 207 K/UL (ref 150–369)
PMV BLD AUTO: 10 FL (ref 9.4–12.3)
RBC # BLD AUTO: 3.36 M/UL (ref 3.93–5.22)
WBC # BLD AUTO: 17.74 K/UL (ref 3.8–10.5)

## 2024-10-18 PROCEDURE — 85027 COMPLETE CBC AUTOMATED: CPT | Performed by: OBSTETRICS & GYNECOLOGY

## 2024-10-18 PROCEDURE — 12000000 HC ROOM AND CARE MED/SURG

## 2024-10-18 PROCEDURE — 63700000 HC SELF-ADMINISTRABLE DRUG: Performed by: OBSTETRICS & GYNECOLOGY

## 2024-10-18 PROCEDURE — 63600000 HC DRUGS/DETAIL CODE: Mod: JZ | Performed by: OBSTETRICS & GYNECOLOGY

## 2024-10-18 PROCEDURE — 36415 COLL VENOUS BLD VENIPUNCTURE: CPT | Performed by: OBSTETRICS & GYNECOLOGY

## 2024-10-18 RX ADMIN — PRENATAL VIT W/ FE FUMARATE-FA TAB 27-0.8 MG 1 TABLET: 27-0.8 TAB at 08:25

## 2024-10-18 RX ADMIN — OXYCODONE HYDROCHLORIDE 5 MG: 5 TABLET ORAL at 22:54

## 2024-10-18 RX ADMIN — CETIRIZINE HYDROCHLORIDE 10 MG: 10 TABLET, FILM COATED ORAL at 08:25

## 2024-10-18 RX ADMIN — ACETAMINOPHEN 975 MG: 325 TABLET ORAL at 08:25

## 2024-10-18 RX ADMIN — KETOROLAC TROMETHAMINE 30 MG: 30 INJECTION, SOLUTION INTRAMUSCULAR at 12:06

## 2024-10-18 RX ADMIN — KETOROLAC TROMETHAMINE 30 MG: 30 INJECTION, SOLUTION INTRAMUSCULAR at 00:14

## 2024-10-18 RX ADMIN — ACETAMINOPHEN 975 MG: 325 TABLET ORAL at 15:14

## 2024-10-18 RX ADMIN — DOCUSATE SODIUM AND SENNOSIDES 1 TABLET: 8.6; 5 TABLET, FILM COATED ORAL at 08:25

## 2024-10-18 RX ADMIN — ACETAMINOPHEN 975 MG: 325 TABLET ORAL at 03:42

## 2024-10-18 RX ADMIN — KETOROLAC TROMETHAMINE 30 MG: 30 INJECTION, SOLUTION INTRAMUSCULAR at 18:23

## 2024-10-18 RX ADMIN — ACETAMINOPHEN 975 MG: 325 TABLET ORAL at 20:58

## 2024-10-18 RX ADMIN — DOCUSATE SODIUM AND SENNOSIDES 1 TABLET: 8.6; 5 TABLET, FILM COATED ORAL at 20:58

## 2024-10-18 RX ADMIN — KETOROLAC TROMETHAMINE 30 MG: 30 INJECTION, SOLUTION INTRAMUSCULAR at 05:47

## 2024-10-18 RX ADMIN — IBUPROFEN 600 MG: 600 TABLET, FILM COATED ORAL at 22:48

## 2024-10-18 NOTE — PLAN OF CARE
Plan of Care Review  Plan of Care Reviewed With: patient, spouse  Progress: improving    VSS, fundal height and lochia WNL, pt states pain is well controlled with pain meds,  ambulating well in room, working on dc paperwork, breastfeeding with minimal assistance, and bonding with infant appropriately.

## 2024-10-18 NOTE — PROGRESS NOTES
"Obstetrics Postpartum Progress Note    Events  Doing overall well.  Is c/o right sided pain from incision that goes down her right leg.  Was bad yesterday after delivery - felt like she couldn't put pressure on it, better last night and now feels like pain returning this AM.  No heavy VB or clot passage.  No bleeding from incision. Did void - hasn't passed flatus yet.  Denies CP/ SOB/ N&V/ fever/ chills.       Vitals  Temp:  [36.3 °C (97.4 °F)-36.7 °C (98.1 °F)] 36.6 °C (97.8 °F)  Heart Rate:  [69-92] 78  Resp:  [16-18] 16  BP: ()/(52-69) 104/55    I&O    Intake/Output Summary (Last 24 hours) at 10/18/2024 0907  Last data filed at 10/18/2024 0215  Gross per 24 hour   Intake 1500 ml   Output 2130 ml   Net -630 ml       Physical Exam  General: well and resting  Heart: Regular rate and rhythm  Lungs: Clear to auscultation bilaterally  Abdomen: soft, nondistended, non-tender, no reound or guarding. +BS  Fundus: firm and below umbilicus  Incision: dressing clean, dry, intact  Perineum: deferred  Extremities: symmetric and 1+ edema    Labs  Labs Reviewed:  No results found for: \"ABO\", \"LABRH\"   Rubella: immune    CBC Results         10/16/24 07/19/24 07/10/24     1353 1020 1413    WBC 15.52 14.8 17.55    RBC 4.30 3.90 3.85    HGB 13.1 12.1 11.9    HCT 40.2 35.3 35.5    MCV 93.5 91 92.2    MCH 30.5 31.0 30.9    MCHC 32.6 34.3 33.5     297 329              Assessment/Plan   Problem-based Assessment and Plan    Kelli Hernandez is a 34 y.o.  postop day 1 s/p , Low Transverse. Due to breech    1. Vital Signs: stable  2. Hemodynamics: stable  3. Pain: controlled  4. VTE Assessment: Early Ambulation  5. Vaccinations/Rhogam: rhogam not indicated   6. Post care: meeting all goals  Told to discuss leg pain w/ anesthesia today , but likely will improve with time.  Continue ERAS medications.        Dorothy Perez, DO      "

## 2024-10-18 NOTE — PROGRESS NOTES
Patient: Kelli Hernandez  Procedure(s):   SECTION  Location: [facility] L&D OR    Last vitals:   Vitals:    10/18/24 0300   BP: (!) 103/52   Pulse: 79   Resp: 16   Temp: 36.7 °C (98.1 °F)   SpO2: 95%     Level of consciousness: Awake, Alert, Oriented  Post-anesthesia pain: Adequate analgesia  Anesthetic complications: None  Nausea and Vomiting Controled: Yes  Hydration: Adequate  Cardiovascular Function: Stable  Neuro Exam: WNL  Respiration: WNL  Pain is well controlled after spinal preservative free morphine administration and additional IV/PO meds:  Continue 24 hours observation after preservative free morphine administration:

## 2024-10-19 PROCEDURE — 63700000 HC SELF-ADMINISTRABLE DRUG: Performed by: OBSTETRICS & GYNECOLOGY

## 2024-10-19 PROCEDURE — 12000000 HC ROOM AND CARE MED/SURG

## 2024-10-19 RX ADMIN — IBUPROFEN 600 MG: 600 TABLET, FILM COATED ORAL at 11:44

## 2024-10-19 RX ADMIN — DOCUSATE SODIUM AND SENNOSIDES 1 TABLET: 8.6; 5 TABLET, FILM COATED ORAL at 10:03

## 2024-10-19 RX ADMIN — CETIRIZINE HYDROCHLORIDE 10 MG: 10 TABLET, FILM COATED ORAL at 10:04

## 2024-10-19 RX ADMIN — ACETAMINOPHEN 975 MG: 325 TABLET ORAL at 18:08

## 2024-10-19 RX ADMIN — ACETAMINOPHEN 975 MG: 325 TABLET ORAL at 03:15

## 2024-10-19 RX ADMIN — IBUPROFEN 600 MG: 600 TABLET, FILM COATED ORAL at 18:08

## 2024-10-19 RX ADMIN — ACETAMINOPHEN 975 MG: 325 TABLET ORAL at 10:03

## 2024-10-19 RX ADMIN — PRENATAL VIT W/ FE FUMARATE-FA TAB 27-0.8 MG 1 TABLET: 27-0.8 TAB at 10:03

## 2024-10-19 RX ADMIN — IBUPROFEN 600 MG: 600 TABLET, FILM COATED ORAL at 05:35

## 2024-10-19 RX ADMIN — IBUPROFEN 600 MG: 600 TABLET, FILM COATED ORAL at 12:00

## 2024-10-19 RX ADMIN — DOCUSATE SODIUM AND SENNOSIDES 1 TABLET: 8.6; 5 TABLET, FILM COATED ORAL at 20:11

## 2024-10-19 NOTE — PROGRESS NOTES
"Obstetrics Postpartum Day #1 Progress Note    Events  S/P CS   POD#2  GIRL. Pt seen and examined    Subjective  Pain well-controlled  Ambulating, voiding and tolerating regular diet   Mild Lochia    Vitals  Temp:  [36.2 °C (97.1 °F)-36.8 °C (98.2 °F)] 36.7 °C (98.1 °F)  Heart Rate:  [] 100  Resp:  [16-18] 16  BP: (102-115)/(51-73) 113/70      Physical Exam  Abd SNT FFNT  No SPT/No CVA tend  Mild lochi    Labs  Labs Reviewed:  No results found for: \"ABO\", \"LABRH\"   Rubella: immune      Problem-based Assessment and Plan    Kelli Hernandez is a 34 y.o.  PPD#2  s/p CS POD#2  Doing well  No complaints  Check Labs HBG 10+ ORAL IRON  Rubella/Rhogam pp per protocol  Continue routine care plan    DC AM    "

## 2024-10-19 NOTE — LACTATION NOTE
"BF progressing. Baby is latching and feeding but PT states that it does feel \"chompy\" at times. Assisted with positioning/latching baby in cross cradle and football holds. Baby is able to latch with intermittent periods of active suckling. PT reports improved comfort when baby latches more deeply to the breast. Went over positioning, deep latch technique, and ways to stimulate baby to stay active at the breast. Reviewed education, importance of feeding on demand at least every 2-3 hours, and ways to tell baby is getting adequate volumes at the breast. Answered all questions at this time. Encouraged to call for assistance as needed.   "

## 2024-10-20 VITALS
OXYGEN SATURATION: 97 % | HEIGHT: 65 IN | TEMPERATURE: 97.8 F | BODY MASS INDEX: 31.16 KG/M2 | HEART RATE: 76 BPM | SYSTOLIC BLOOD PRESSURE: 115 MMHG | DIASTOLIC BLOOD PRESSURE: 56 MMHG | WEIGHT: 187 LBS | RESPIRATION RATE: 16 BRPM

## 2024-10-20 PROBLEM — Z3A.39 39 WEEKS GESTATION OF PREGNANCY: Status: RESOLVED | Noted: 2024-10-17 | Resolved: 2024-10-20

## 2024-10-20 PROBLEM — Z34.00 PRIMIGRAVIDA, ANTEPARTUM: Status: RESOLVED | Noted: 2024-04-09 | Resolved: 2024-10-20

## 2024-10-20 PROCEDURE — 63700000 HC SELF-ADMINISTRABLE DRUG: Performed by: OBSTETRICS & GYNECOLOGY

## 2024-10-20 RX ORDER — OXYCODONE HYDROCHLORIDE 5 MG/1
5 TABLET ORAL EVERY 4 HOURS PRN
Qty: 20 TABLET | Refills: 0 | Status: SHIPPED | OUTPATIENT
Start: 2024-10-20 | End: 2024-10-25

## 2024-10-20 RX ORDER — ACETAMINOPHEN 325 MG/1
975 TABLET ORAL
Qty: 360 TABLET | Refills: 0 | Status: SHIPPED | OUTPATIENT
Start: 2024-10-20 | End: 2024-11-19

## 2024-10-20 RX ORDER — IBUPROFEN 600 MG/1
600 TABLET ORAL EVERY 6 HOURS PRN
Qty: 40 TABLET | Refills: 1 | Status: SHIPPED | OUTPATIENT
Start: 2024-10-20 | End: 2024-10-30

## 2024-10-20 RX ORDER — FERROUS SULFATE 325(65) MG
325 TABLET ORAL DAILY PRN
Qty: 30 TABLET | Refills: 1 | Status: SHIPPED | OUTPATIENT
Start: 2024-10-20 | End: 2024-11-19

## 2024-10-20 RX ADMIN — ACETAMINOPHEN 975 MG: 325 TABLET ORAL at 00:15

## 2024-10-20 RX ADMIN — IBUPROFEN 600 MG: 600 TABLET, FILM COATED ORAL at 11:54

## 2024-10-20 RX ADMIN — CETIRIZINE HYDROCHLORIDE 10 MG: 10 TABLET, FILM COATED ORAL at 09:14

## 2024-10-20 RX ADMIN — ACETAMINOPHEN 975 MG: 325 TABLET ORAL at 11:54

## 2024-10-20 RX ADMIN — DOCUSATE SODIUM AND SENNOSIDES 1 TABLET: 8.6; 5 TABLET, FILM COATED ORAL at 09:14

## 2024-10-20 RX ADMIN — IBUPROFEN 600 MG: 600 TABLET, FILM COATED ORAL at 06:20

## 2024-10-20 RX ADMIN — ACETAMINOPHEN 975 MG: 325 TABLET ORAL at 06:19

## 2024-10-20 RX ADMIN — PRENATAL VIT W/ FE FUMARATE-FA TAB 27-0.8 MG 1 TABLET: 27-0.8 TAB at 09:14

## 2024-10-20 RX ADMIN — IBUPROFEN 600 MG: 600 TABLET, FILM COATED ORAL at 00:16

## 2024-10-20 NOTE — LACTATION NOTE
PT is BF and now supplementing with DHM and pumping due to 10% weight loss. Reviewed plan for home and when to wean from supplemental feeds. She has a lactation provider for home. Will purchase 5 bottles DHM.

## 2024-10-20 NOTE — PLAN OF CARE
Problem: Adult Inpatient Plan of Care  Goal: Plan of Care Review  Outcome: Progressing  Flowsheets (Taken 10/20/2024 8568)  Progress: improving  Outcome Evaluation: pain controlled w tylenol and motrin atc. VSS.  Plan of Care Reviewed With:   patient   spouse  Goal: Patient-Specific Goal (Individualized)  Outcome: Progressing  Goal: Absence of Hospital-Acquired Illness or Injury  Outcome: Progressing  Goal: Optimal Comfort and Wellbeing  Outcome: Progressing  Goal: Readiness for Transition of Care  Outcome: Progressing   Plan of Care Review  Plan of Care Reviewed With: patient, spouse  Progress: improving  Outcome Evaluation: pain controlled w tylenol and motrin atc. VSS.

## 2024-10-25 ENCOUNTER — OFFICE VISIT (OUTPATIENT)
Dept: OBSTETRICS AND GYNECOLOGY | Facility: CLINIC | Age: 34
End: 2024-10-25
Payer: COMMERCIAL

## 2024-10-25 VITALS — DIASTOLIC BLOOD PRESSURE: 78 MMHG | SYSTOLIC BLOOD PRESSURE: 122 MMHG | BODY MASS INDEX: 28.12 KG/M2 | WEIGHT: 169 LBS

## 2024-10-25 PROCEDURE — 0503F POSTPARTUM CARE VISIT: CPT | Performed by: OBSTETRICS & GYNECOLOGY

## 2024-10-25 NOTE — PROGRESS NOTES
Patient ID: Kelli Hernandez   : 1990 34 y.o.  MRN: [id number]   Visit Date: 10/25/2024    Subjective   Kelli Hernandez is presenting today for Postpartum Follow-up      HPI    Pt here for incision check. Had 1LTCs for breech with Krishna on 10/17. Still taking motrin and tylenol but pain minimal.      Pap:     Lab Results   Component Value Date    FINALDX  10/17/2024     A.  Right cyst found during ; excision:   Benign serous lined cyst; see comment    B.  Left cyst found during ; excision:   Benign serous lined cyst; see comment        **ATTENTION PATIENTS**  **The findings in this report have been made available for review potentially before your provider has had a chance to review and discuss the results with you.  Please allow time for your provider to review your results.  If you have any questions or concerns about these results, please contact the healthcare provider who ordered the test.**             Past Medical History:  has a past medical history of Asthma, COVID-19 affecting pregnancy in third trimester, Pneumonia, and Skin cancer.     Past Surgical History:  has a past surgical history that includes Skin surgery.    Obstetric History:   OB History    Para Term  AB Living   1 1 1 0 0 1   SAB IAB Ectopic Multiple Live Births   0 0 0 0 1      # Outcome Date GA Lbr Jacky/2nd Weight Sex Type Anes PTL Lv   1 Term 10/17/24 39w2d  3669 g (8 lb 1.4 oz) F CS-LTranv Spinal N EVA       Medications:   Current Outpatient Medications:     acetaminophen (TYLENOL) 325 mg tablet, Take 3 tablets (975 mg total) by mouth every 6 (six) hours., Disp: 360 tablet, Rfl: 0    cholecalciferol, vitamin D3, (VITAMIN D3 ORAL), Take by mouth., Disp: , Rfl:     ferrous sulfate 325 mg (65 mg iron) tablet, Take 1 tablet (325 mg total) by mouth daily as needed (hbg less than 10)., Disp: 30 tablet, Rfl: 1    MAGNESIUM ORAL, Take by mouth., Disp: , Rfl:     omega-3 fatty acids-fish oil 300-1,000  mg capsule, Take 1,000 mg by mouth daily., Disp: , Rfl:     PRENATAL 105-IRON-FOLIC AC-DHA ORAL, Take by mouth., Disp: , Rfl:     albuterol HFA 90 mcg/actuation inhaler, Inhale 2 puffs every 6 hours as needed., Disp: , Rfl:     cetirizine (ZyrTEC) 10 mg tablet, Take 10 mg by mouth daily., Disp: , Rfl:     ibuprofen (MOTRIN) 600 mg tablet, Take 1 tablet (600 mg total) by mouth every 6 (six) hours as needed for pain for up to 10 days., Disp: 40 tablet, Rfl: 1    oxyCODONE (ROXICODONE) 5 mg immediate release tablet, Take 1 tablet (5 mg total) by mouth every 4 (four) hours as needed for pain (breakthrough pain) for up to 5 days. (Patient not taking: Reported on 10/25/2024), Disp: 20 tablet, Rfl: 0      Allergies: is allergic to penicillins, mold, and pollen extracts.       Vital Signs for this encounter: Visit Vitals  /78 (BP Location: Right upper arm, Patient Position: Sitting)   Wt 76.7 kg (169 lb)   LMP 01/16/2024   Breastfeeding Yes   BMI 28.12 kg/m²       Review of Systems   General Appearance: Alert, cooperative, no acute distress  Head: Normocephalic, without obvious abnormality  Abdomen: Soft, nontender, nondistended,no masses, no organomegaly, incision C/D/I steristrips removed   Extremities: no edema      Impression/Plan:    Incision healing well. Reviewed restrictions. Pumping/nursing right now, baby girl doing well had tongue tie release. Scored 11 on PPD - offered resources, states she's coping ok and has good support. Denies SI. Will RTO in 4 weeks for PPV.     There are no diagnoses linked to this encounter.     Jeanie Harris,

## 2024-11-21 ENCOUNTER — OFFICE VISIT (OUTPATIENT)
Dept: OBSTETRICS AND GYNECOLOGY | Facility: CLINIC | Age: 34
End: 2024-11-21
Payer: COMMERCIAL

## 2024-11-21 VITALS — SYSTOLIC BLOOD PRESSURE: 122 MMHG | DIASTOLIC BLOOD PRESSURE: 78 MMHG

## 2024-11-21 DIAGNOSIS — M62.89 PELVIC FLOOR DYSFUNCTION: ICD-10-CM

## 2024-11-21 PROCEDURE — 0503F POSTPARTUM CARE VISIT: CPT | Performed by: OBSTETRICS & GYNECOLOGY

## (undated) DEVICE — SUTURE BIOSYN 4-0 UNDYED 1X18 P-12

## (undated) DEVICE — PAD GROUND ELECTROSURGICAL W/CORD

## (undated) DEVICE — SUTURE PLAIN 2-0 GUT UNDYED 1X30 GS-21

## (undated) DEVICE — SPONGE LAP 18X18 SAFE-T RFID ENHANCED XRAY

## (undated) DEVICE — SUTURE POLYSORB 0 VIOLET 1X36 GS-25

## (undated) DEVICE — SUTURE POLYSORB 1 UNDYED 1X36 GS-24

## (undated) DEVICE — DRESSING ANTIMICROBIAL FOAM OPTIFOAM POST OP ADHESIVE 4X10 I

## (undated) DEVICE — GOWN SIRUS FABRNF RAGLAN XL ST 28/CS

## (undated) DEVICE — DRESSING OPTIFOAM 4 X 10IN POST-OP

## (undated) DEVICE — CONTAINER SPECIMEN STERILE 5OZ

## (undated) DEVICE — SUTURE POLYSORB 3-0 UNDYED 1X30 SC-2

## (undated) DEVICE — APPLICATOR CHLORAPREP 26ML ORANGE TINT

## (undated) DEVICE — BULB SYRINGE IRRIGATION STERILE

## (undated) DEVICE — SUTURE PLAIN GUT 2-0, UNDYED 30" GS-25

## (undated) DEVICE — PACK RFID MLH DELIVERY STANDARDIZED

## (undated) DEVICE — TUBING SMOKE EVAC PENCIL COATED

## (undated) DEVICE — BETADINE SOLUTION 8OZ BT